# Patient Record
Sex: FEMALE | Race: WHITE | NOT HISPANIC OR LATINO | Employment: PART TIME | URBAN - METROPOLITAN AREA
[De-identification: names, ages, dates, MRNs, and addresses within clinical notes are randomized per-mention and may not be internally consistent; named-entity substitution may affect disease eponyms.]

---

## 2020-07-31 ENCOUNTER — LAB (OUTPATIENT)
Dept: LAB | Facility: HOSPITAL | Age: 20
End: 2020-07-31

## 2020-07-31 ENCOUNTER — OFFICE VISIT (OUTPATIENT)
Dept: FAMILY MEDICINE CLINIC | Facility: CLINIC | Age: 20
End: 2020-07-31

## 2020-07-31 VITALS
WEIGHT: 124 LBS | HEIGHT: 66 IN | BODY MASS INDEX: 19.93 KG/M2 | SYSTOLIC BLOOD PRESSURE: 102 MMHG | DIASTOLIC BLOOD PRESSURE: 68 MMHG

## 2020-07-31 DIAGNOSIS — Z00.00 PHYSICAL EXAM, ANNUAL: ICD-10-CM

## 2020-07-31 DIAGNOSIS — Z76.89 ENCOUNTER TO ESTABLISH CARE: Primary | ICD-10-CM

## 2020-07-31 DIAGNOSIS — Z13.0 SCREENING FOR DEFICIENCY ANEMIA: ICD-10-CM

## 2020-07-31 DIAGNOSIS — Z11.59 ENCOUNTER FOR HEPATITIS C SCREENING TEST FOR LOW RISK PATIENT: ICD-10-CM

## 2020-07-31 DIAGNOSIS — Z11.1 VISIT FOR TB SKIN TEST: ICD-10-CM

## 2020-07-31 DIAGNOSIS — Z13.1 SCREENING FOR DIABETES MELLITUS: ICD-10-CM

## 2020-07-31 DIAGNOSIS — Z13.29 SCREENING FOR THYROID DISORDER: ICD-10-CM

## 2020-07-31 DIAGNOSIS — E55.9 VITAMIN D DEFICIENCY: ICD-10-CM

## 2020-07-31 DIAGNOSIS — Z23 NEED FOR TDAP VACCINATION: ICD-10-CM

## 2020-07-31 PROBLEM — K21.9 ACID REFLUX: Status: ACTIVE | Noted: 2020-07-31

## 2020-07-31 PROBLEM — G43.909 MIGRAINE WITHOUT STATUS MIGRAINOSUS, NOT INTRACTABLE: Status: ACTIVE | Noted: 2020-07-31

## 2020-07-31 LAB
ALBUMIN SERPL-MCNC: 4.4 G/DL (ref 3.5–5.2)
ALBUMIN/GLOB SERPL: 1.7 G/DL
ALP SERPL-CCNC: 60 U/L (ref 39–117)
ALT SERPL W P-5'-P-CCNC: 11 U/L (ref 1–33)
ANION GAP SERPL CALCULATED.3IONS-SCNC: 10.6 MMOL/L (ref 5–15)
AST SERPL-CCNC: 17 U/L (ref 1–32)
BASOPHILS # BLD AUTO: 0.05 10*3/MM3 (ref 0–0.2)
BASOPHILS NFR BLD AUTO: 0.7 % (ref 0–1.5)
BILIRUB SERPL-MCNC: 0.9 MG/DL (ref 0–1.2)
BUN SERPL-MCNC: 7 MG/DL (ref 6–20)
BUN/CREAT SERPL: 8.6 (ref 7–25)
CALCIUM SPEC-SCNC: 9.6 MG/DL (ref 8.6–10.5)
CHLORIDE SERPL-SCNC: 102 MMOL/L (ref 98–107)
CO2 SERPL-SCNC: 26.4 MMOL/L (ref 22–29)
CREAT SERPL-MCNC: 0.81 MG/DL (ref 0.57–1)
DEPRECATED RDW RBC AUTO: 39.7 FL (ref 37–54)
EOSINOPHIL # BLD AUTO: 0.1 10*3/MM3 (ref 0–0.4)
EOSINOPHIL NFR BLD AUTO: 1.4 % (ref 0.3–6.2)
ERYTHROCYTE [DISTWIDTH] IN BLOOD BY AUTOMATED COUNT: 11.1 % (ref 12.3–15.4)
GFR SERPL CREATININE-BSD FRML MDRD: 88 ML/MIN/1.73
GLOBULIN UR ELPH-MCNC: 2.6 GM/DL
GLUCOSE SERPL-MCNC: 78 MG/DL (ref 65–99)
HCT VFR BLD AUTO: 40.2 % (ref 34–46.6)
HGB BLD-MCNC: 13.7 G/DL (ref 12–15.9)
IMM GRANULOCYTES # BLD AUTO: 0.01 10*3/MM3 (ref 0–0.05)
IMM GRANULOCYTES NFR BLD AUTO: 0.1 % (ref 0–0.5)
INDURATION: 0 MM (ref 0–10)
LYMPHOCYTES # BLD AUTO: 2.84 10*3/MM3 (ref 0.7–3.1)
LYMPHOCYTES NFR BLD AUTO: 41.1 % (ref 19.6–45.3)
MCH RBC QN AUTO: 32.8 PG (ref 26.6–33)
MCHC RBC AUTO-ENTMCNC: 34.1 G/DL (ref 31.5–35.7)
MCV RBC AUTO: 96.2 FL (ref 79–97)
MONOCYTES # BLD AUTO: 0.48 10*3/MM3 (ref 0.1–0.9)
MONOCYTES NFR BLD AUTO: 6.9 % (ref 5–12)
NEUTROPHILS NFR BLD AUTO: 3.43 10*3/MM3 (ref 1.7–7)
NEUTROPHILS NFR BLD AUTO: 49.8 % (ref 42.7–76)
NRBC BLD AUTO-RTO: 0 /100 WBC (ref 0–0.2)
PLATELET # BLD AUTO: 274 10*3/MM3 (ref 140–450)
PMV BLD AUTO: 11.3 FL (ref 6–12)
POTASSIUM SERPL-SCNC: 3.8 MMOL/L (ref 3.5–5.2)
PROT SERPL-MCNC: 7 G/DL (ref 6–8.5)
RBC # BLD AUTO: 4.18 10*6/MM3 (ref 3.77–5.28)
SODIUM SERPL-SCNC: 139 MMOL/L (ref 136–145)
TB SKIN TEST: NEGATIVE
WBC # BLD AUTO: 6.91 10*3/MM3 (ref 3.4–10.8)

## 2020-07-31 PROCEDURE — 90715 TDAP VACCINE 7 YRS/> IM: CPT | Performed by: PHYSICIAN ASSISTANT

## 2020-07-31 PROCEDURE — 86803 HEPATITIS C AB TEST: CPT | Performed by: PHYSICIAN ASSISTANT

## 2020-07-31 PROCEDURE — 82306 VITAMIN D 25 HYDROXY: CPT | Performed by: PHYSICIAN ASSISTANT

## 2020-07-31 PROCEDURE — 86580 TB INTRADERMAL TEST: CPT | Performed by: PHYSICIAN ASSISTANT

## 2020-07-31 PROCEDURE — 85025 COMPLETE CBC W/AUTO DIFF WBC: CPT | Performed by: PHYSICIAN ASSISTANT

## 2020-07-31 PROCEDURE — 36415 COLL VENOUS BLD VENIPUNCTURE: CPT | Performed by: PHYSICIAN ASSISTANT

## 2020-07-31 PROCEDURE — 80053 COMPREHEN METABOLIC PANEL: CPT | Performed by: PHYSICIAN ASSISTANT

## 2020-07-31 PROCEDURE — 90471 IMMUNIZATION ADMIN: CPT | Performed by: PHYSICIAN ASSISTANT

## 2020-07-31 PROCEDURE — 84443 ASSAY THYROID STIM HORMONE: CPT | Performed by: PHYSICIAN ASSISTANT

## 2020-07-31 PROCEDURE — 99385 PREV VISIT NEW AGE 18-39: CPT | Performed by: PHYSICIAN ASSISTANT

## 2020-07-31 RX ORDER — LEVONORGESTREL AND ETHINYL ESTRADIOL 0.1-0.02MG
1 KIT ORAL DAILY
COMMUNITY
End: 2020-10-15 | Stop reason: SDUPTHER

## 2020-07-31 NOTE — PROGRESS NOTES
Patient Care Team:  Rosalba Palacios PA-C as PCP - General (Physician Assistant)     Chief complaint: Patient is in today for a physical     Calliegh Tayler Coyle is a 20 y.o. female who presents for her yearly physical exam.     Patient is very healthy with no significant past medical history.  Takes levsin prn, maybe once a week for GI issues and is on birth control.  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  Originally from [facility].  Recently had tonsillectomy 2 weeks ago and is recovering well.  Goes to eye doctor regularly.  Due for dental screening.  Needs referral to gynecologist, had pap smear last year and it was normal.  She has no concerns for STIs.  Denies any skin lesions or moles, wears sunscreen regularly.  Needs Tdap and 2 step TB skin test today.  She has no complaints or concerns today.       Review of Systems   Constitutional: Negative for activity change, appetite change, chills, diaphoresis, fatigue, fever, unexpected weight gain and unexpected weight loss.   HENT: Positive for ear pain and sore throat. Negative for congestion, dental problem, hearing loss, nosebleeds, sinus pressure and trouble swallowing.    Eyes: Negative for blurred vision, pain, redness and visual disturbance.   Respiratory: Negative for apnea, cough, chest tightness, shortness of breath and wheezing.    Cardiovascular: Negative for chest pain, palpitations and leg swelling.   Gastrointestinal: Negative for abdominal distention, abdominal pain, anal bleeding, blood in stool, constipation, diarrhea, nausea, vomiting, GERD and indigestion.   Endocrine: Negative for cold intolerance, heat intolerance, polydipsia, polyphagia and polyuria.   Genitourinary: Negative for decreased urine volume, difficulty urinating, dysuria, frequency, hematuria, urgency and urinary incontinence.   Musculoskeletal: Negative for gait problem, joint swelling and bursitis.   Skin: Negative for dry skin, rash, skin  lesions and bruise.   Neurological: Positive for headache. Negative for dizziness, tremors, seizures, syncope, speech difficulty, weakness, light-headedness, memory problem and confusion.   Hematological: Does not bruise/bleed easily.   Psychiatric/Behavioral: Negative for agitation, behavioral problems, decreased concentration, hallucinations, sleep disturbance, suicidal ideas, depressed mood and stress. The patient is not nervous/anxious.         History  History reviewed. No pertinent past medical history.   Past Surgical History:   Procedure Laterality Date   • CHOLECYSTECTOMY     • TONSILLECTOMY        Allergies   Allergen Reactions   • Bactrim [Sulfamethoxazole-Trimethoprim] Swelling     THROAT    • Toradol [Ketorolac Tromethamine] Hives      Family History   Problem Relation Age of Onset   • Migraines Mother    • Depression Mother    • Irritable bowel syndrome Mother    • Arthritis Maternal Grandmother    • Hyperlipidemia Maternal Grandfather    • Diabetes Maternal Grandfather    • Arthritis Paternal Grandmother    • Colon cancer Paternal Grandmother    • Colon cancer Paternal Grandfather 70   • Heart disease Paternal Grandfather    • Hyperlipidemia Paternal Grandfather    • Breast cancer Neg Hx    • Ovarian cancer Neg Hx    • Prostate cancer Neg Hx       Social History     Socioeconomic History   • Marital status: Single     Spouse name: Not on file   • Number of children: Not on file   • Years of education: Not on file   • Highest education level: Not on file   Tobacco Use   • Smoking status: Never Smoker   • Smokeless tobacco: Never Used   Substance and Sexual Activity   • Alcohol use: Not Currently     Frequency: Never   • Drug use: Never   • Sexual activity: Yes     Partners: Male     Birth control/protection: Pill      Current Outpatient Medications on File Prior to Visit   Medication Sig Dispense Refill   • hyoscyamine (LEVSIN) 0.125 MG SL tablet Take 0.125 mg by mouth Every 4 (Four) Hours As Needed  "for Cramping.     • levonorgestrel-ethinyl estradiol (Lessina) 0.1-20 MG-MCG per tablet Take 1 tablet by mouth Daily.       No current facility-administered medications on file prior to visit.        No results found for this or any previous visit.    Health Maintenance   Topic Date Due   • TDAP/TD VACCINES (1 - Tdap) 2009   • HEPATITIS C SCREENING  2020   • CHLAMYDIA SCREENING  2020   • PAP SMEAR  2020   • INFLUENZA VACCINE  2020   • ANNUAL PHYSICAL  2021   • MENINGOCOCCAL VACCINE (Normal Risk)  Aged Out   • HPV VACCINES  Discontinued       Immunizations  Td/Tdap(Booster Q 10 yrs):  Prescribed  Flu (Yearly):  N/A  Pneumovax (1 yr after Prevnar):  N/A  Tlmycov94 (1 yr after Pneumo):  N/A  Hep B:  Completed  Shringrix:  N/A}   There is no immunization history for the selected administration types on file for this patient.      Diabetes:  No   Eye Exam: N/A   Foot Exam:  N/A  Obesity Counseling:  N/A  No results found for: HGBA1C, MICROALBUR    Patient's Body mass index is 20.17 kg/m². BMI is within normal parameters. No follow-up required..      Colorectal Screening:  N/A  Pap:  Complete  Mammogram:  N/A  PSA(Over age 50):  N/A  US Aorta (For male smokers, age 65):  N/A  CT for Smoker (Age 55-75, 30pk yr):  N/A  Bone Density/DEXA:  N/A  Hep C ( 6505-8905):  Ordered Today      No results found for this or any previous visit.         Vitals:    20 1432   BP: 102/68   BP Location: Left arm   Patient Position: Sitting   Cuff Size: Adult   Weight: 56.2 kg (124 lb)   Height: 167 cm (65.75\")       Body mass index is 20.17 kg/m².    Physical Exam   Constitutional: She is oriented to person, place, and time. Vital signs are normal. She appears well-developed and well-nourished. She is active and cooperative. She does not appear ill. No distress. She is not overweight.  HENT:   Head: Normocephalic and atraumatic.   Right Ear: Hearing, tympanic membrane, external ear and ear canal " normal.   Left Ear: Hearing, tympanic membrane, external ear and ear canal normal.   Nose: Nose normal. Right sinus exhibits no maxillary sinus tenderness and no frontal sinus tenderness. Left sinus exhibits no maxillary sinus tenderness and no frontal sinus tenderness.   Mouth/Throat: Uvula is midline, oropharynx is clear and moist and mucous membranes are normal.   Eyes: Conjunctivae, EOM and lids are normal.   Neck: Trachea normal, normal range of motion and phonation normal. No thyroid mass and no thyromegaly present.   Cardiovascular: Normal rate, regular rhythm and normal heart sounds.   Pulmonary/Chest: Effort normal and breath sounds normal.   Abdominal: Soft. Normal appearance and bowel sounds are normal. She exhibits no distension. There is no tenderness. There is no rigidity, no guarding and no CVA tenderness.   Musculoskeletal: Normal range of motion. She exhibits no edema.   Lymphadenopathy:     She has no cervical adenopathy.        Right cervical: No superficial cervical adenopathy present.       Left cervical: No superficial cervical adenopathy present.   Neurological: She is alert and oriented to person, place, and time. She has normal strength and normal reflexes. Coordination and gait normal.   CN grossly intact   Skin: Skin is warm and intact. No rash noted. She is not diaphoretic. No cyanosis or erythema. Nails show no clubbing.   Psychiatric: She has a normal mood and affect. Her speech is normal and behavior is normal. Judgment and thought content normal. She is not actively hallucinating. Cognition and memory are normal. She is attentive.   Vitals reviewed.          Counseling provided on diet and nutrition, exercise, supplements and flu prevention.    David was seen today for establish care.    Diagnoses and all orders for this visit:    Encounter to establish care  -     Ambulatory Referral to Gynecology    Physical exam, annual  -     CBC Auto Differential; Future  -     TSH Rfx On  Abnormal To Free T4; Future  -     Comprehensive Metabolic Panel; Future  PE is unremarkable  Preventative labs ordered  Gynecology referral placed  Dentist - she needs to make appointment  Ophthalmologist - goes regularly  Dermatologist - denies any skin lesions or moles  Flu vaccine encouraged in Fall, Tdap today    Encounter for hepatitis C screening test for low risk patient  -     Hepatitis C Antibody; Future    Vitamin D deficiency  -     Vitamin D 25 Hydroxy; Future    Screening for deficiency anemia  -     CBC Auto Differential; Future    Screening for diabetes mellitus  -     Comprehensive Metabolic Panel; Future    Screening for thyroid disorder  -     TSH Rfx On Abnormal To Free T4; Future    Visit for TB skin test  -     TB Skin Test    Need for Tdap vaccination  -     Tdap Vaccine Greater Than or Equal To 8yo IM       Rosalba Palacios PA-C   7/31/2020   15:17

## 2020-08-01 LAB
25(OH)D3 SERPL-MCNC: 41.9 NG/ML (ref 30–100)
HCV AB SER DONR QL: NORMAL
TSH SERPL DL<=0.05 MIU/L-ACNC: 0.79 UIU/ML (ref 0.27–4.2)

## 2020-08-10 ENCOUNTER — CLINICAL SUPPORT (OUTPATIENT)
Dept: FAMILY MEDICINE CLINIC | Facility: CLINIC | Age: 20
End: 2020-08-10

## 2020-08-10 DIAGNOSIS — Z11.1 VISIT FOR TB SKIN TEST: Primary | ICD-10-CM

## 2020-08-10 LAB
INDURATION: 0 MM (ref 0–10)
Lab: NORMAL
Lab: NORMAL
TB SKIN TEST: NEGATIVE

## 2020-08-10 PROCEDURE — 86580 TB INTRADERMAL TEST: CPT | Performed by: PHYSICIAN ASSISTANT

## 2020-10-09 PROBLEM — Z01.419 WELL WOMAN EXAM: Status: ACTIVE | Noted: 2020-10-09

## 2020-10-15 ENCOUNTER — OFFICE VISIT (OUTPATIENT)
Dept: OBSTETRICS AND GYNECOLOGY | Facility: CLINIC | Age: 20
End: 2020-10-15

## 2020-10-15 VITALS
BODY MASS INDEX: 20 KG/M2 | RESPIRATION RATE: 14 BRPM | DIASTOLIC BLOOD PRESSURE: 64 MMHG | SYSTOLIC BLOOD PRESSURE: 106 MMHG | WEIGHT: 123 LBS

## 2020-10-15 DIAGNOSIS — Z01.419 WELL WOMAN EXAM: Primary | ICD-10-CM

## 2020-10-15 DIAGNOSIS — Z72.51 HIGH RISK HETEROSEXUAL BEHAVIOR: ICD-10-CM

## 2020-10-15 PROBLEM — K21.9 ACID REFLUX: Status: RESOLVED | Noted: 2020-07-31 | Resolved: 2020-10-15

## 2020-10-15 PROCEDURE — 99385 PREV VISIT NEW AGE 18-39: CPT | Performed by: OBSTETRICS & GYNECOLOGY

## 2020-10-15 RX ORDER — LEVONORGESTREL AND ETHINYL ESTRADIOL 0.1-0.02MG
1 KIT ORAL DAILY
Qty: 84 TABLET | Refills: 5 | Status: SHIPPED | OUTPATIENT
Start: 2020-10-15 | End: 2021-06-16 | Stop reason: SINTOL

## 2020-10-15 RX ORDER — MULTIVITAMIN
TABLET ORAL DAILY
COMMUNITY

## 2020-10-15 NOTE — PROGRESS NOTES
Subjective   Chief Complaint   Patient presents with   • Gynecologic Exam     David Coyle is a 20 y.o. year old  presenting to be seen for her annual exam.     SEXUAL Hx:  She is currently sexually active.  In the past year there there has been NO new sexual partners.    Condoms are never used.  She would not like to be screened for STD's at today's exam.  Current birth control method: OCP (estrogen/progesterone).  She is happy with her current method of contraception and does not want to discuss alternative methods of contraception.  MENSTRUAL Hx:  Patient's last menstrual period was 2020 (approximate).  In the past 6 months her cycles have been regular, predictable and occur monthly.  Her menstrual flow is typically normal.   Each month on average there are roughly 0 day(s) of very heavy flow.  Intermenstrual bleeding is present - once.    Post-coital bleeding is absent.  Dysmenorrhea: is not affecting her activities of daily living  PMS: is not affecting her activities of daily living  Her cycles are not a source of concern for her that she wishes to discuss today.  HEALTH Hx:  She exercises regularly: no (but is planning to start exercising more ).  She wears her seat belt: yes.  She has concerns about domestic violence: no.  OTHER THINGS SHE WANTS TO DISCUSS TODAY:  Nothing else    The following portions of the patient's history were reviewed and updated as appropriate:problem list, current medications, allergies, past family history, past medical history, past social history and past surgical history.    Social History    Tobacco Use      Smoking status: Never Smoker      Smokeless tobacco: Never Used    Review of Systems  Constitutional POS: nothing reported    NEG: anorexia or night sweats   Genitourinary POS: nothing reported    NEG: dysuria or hematuria      Gastointestinal POS: nothing reported    NEG: bloating, change in bowel habits, melena or reflux symptoms   Integument POS:  nothing reported    NEG: moles that are changing in size, shape, color or rashes   Breast POS: nothing reported    NEG: persistent breast lump, skin dimpling or nipple discharge        Objective   /64   Resp 14   Wt 55.8 kg (123 lb)   LMP 09/20/2020 (Approximate)   Breastfeeding No   BMI 20.00 kg/m²     General:  well developed; well nourished  no acute distress   Pelvis: Clinical staff was present for exam  External genitalia:  normal appearance of the external genitalia including Bartholin's and [place]'s glands.  :  urethral meatus normal;  Vaginal:  normal pink mucosa without prolapse or lesions.  Cervix:  normal appearance.        Assessment   1. Normal GYN exam  2. STD screening  3. Irregular menses 1 cycle only     Plan   1. Pap and STD testing was done today.  If she does not receive the results of the Pap within 2 weeks  time, she was instructed to call to find out the results.  I explained to David that the recommendations for Pap smear interval in a low risk patient's has lengthened to 3 years time.  I encouraged her to be seen yearly for a full physical exam including breast and pelvic exam even during the off years when PAP's will not be performed.  2. If irregular menses continues to be a problem let me know and we will talk about ways to add estrogen to her current pill pack that should eliminate the breakthrough bleeding  3. I discussed with David that she may be behind on needed vaccinations for vascular to get me information about her HPV vaccinations we can update her vaccine schedule.  She may be able to obtain these vaccinations at her local pharmacy OR speak about obtaining them with her primary care.  If she does obtain her vaccines, I have asked David to let us know the date each vaccine was obtained so that her medical record could be updated in our system.  4. The use of condoms for STD prevention was emphasized.  It was explained to David that condoms are not a full  proof way to eliminate the chance of a sexually transmitted disease.  Ultimately knowing her partner's sexual history is most important.  All of her questions related to condom use were answered.  5. The importance of keeping all planned follow-up and taking all medications as prescribed was emphasized.  6. Follow up PRN     New Medications Ordered This Visit   Medications   • levonorgestrel-ethinyl estradiol (Lessina) 0.1-20 MG-MCG per tablet     Sig: Take 1 tablet by mouth Daily.     Dispense:  84 tablet     Refill:  5          This note was electronically signed.    Noman Aguilar M.D.  October 15, 2020    Note: Speech recognition transcription software may have been used to create portions of this document.  An attempt at proofreading has been made but errors in transcription could still be present.

## 2021-01-23 PROCEDURE — 87086 URINE CULTURE/COLONY COUNT: CPT | Performed by: FAMILY MEDICINE

## 2021-02-22 ENCOUNTER — LAB (OUTPATIENT)
Dept: LAB | Facility: HOSPITAL | Age: 21
End: 2021-02-22

## 2021-02-22 ENCOUNTER — OFFICE VISIT (OUTPATIENT)
Dept: FAMILY MEDICINE CLINIC | Facility: CLINIC | Age: 21
End: 2021-02-22

## 2021-02-22 VITALS
WEIGHT: 121 LBS | SYSTOLIC BLOOD PRESSURE: 102 MMHG | BODY MASS INDEX: 20.16 KG/M2 | TEMPERATURE: 97.8 F | HEIGHT: 65 IN | DIASTOLIC BLOOD PRESSURE: 80 MMHG | HEART RATE: 84 BPM | OXYGEN SATURATION: 98 %

## 2021-02-22 DIAGNOSIS — L03.031 PARONYCHIA OF TOE OF BOTH FEET: Primary | ICD-10-CM

## 2021-02-22 DIAGNOSIS — L03.032 PARONYCHIA OF TOE OF BOTH FEET: Primary | ICD-10-CM

## 2021-02-22 DIAGNOSIS — Z13.1 SCREENING FOR DIABETES MELLITUS: ICD-10-CM

## 2021-02-22 DIAGNOSIS — Z13.29 SCREENING FOR THYROID DISORDER: ICD-10-CM

## 2021-02-22 DIAGNOSIS — L81.9 DISCOLORATION OF SKIN OF TOE: ICD-10-CM

## 2021-02-22 DIAGNOSIS — R10.12 LUQ PAIN: ICD-10-CM

## 2021-02-22 DIAGNOSIS — R39.15 URGENCY OF URINATION: ICD-10-CM

## 2021-02-22 LAB
ALBUMIN SERPL-MCNC: 4.4 G/DL (ref 3.5–5.2)
ALBUMIN/GLOB SERPL: 1.6 G/DL
ALP SERPL-CCNC: 74 U/L (ref 39–117)
ALT SERPL W P-5'-P-CCNC: 11 U/L (ref 1–33)
ANION GAP SERPL CALCULATED.3IONS-SCNC: 8.8 MMOL/L (ref 5–15)
APTT PPP: 31.2 SECONDS (ref 24–37)
AST SERPL-CCNC: 19 U/L (ref 1–32)
BILIRUB BLD-MCNC: NEGATIVE MG/DL
BILIRUB SERPL-MCNC: 1.6 MG/DL (ref 0–1.2)
BUN SERPL-MCNC: 8 MG/DL (ref 6–20)
BUN/CREAT SERPL: 11.6 (ref 7–25)
CALCIUM SPEC-SCNC: 9.7 MG/DL (ref 8.6–10.5)
CHLORIDE SERPL-SCNC: 103 MMOL/L (ref 98–107)
CLARITY, POC: CLEAR
CO2 SERPL-SCNC: 26.2 MMOL/L (ref 22–29)
COLOR UR: YELLOW
CREAT SERPL-MCNC: 0.69 MG/DL (ref 0.57–1)
GFR SERPL CREATININE-BSD FRML MDRD: 106 ML/MIN/1.73
GLOBULIN UR ELPH-MCNC: 2.8 GM/DL
GLUCOSE SERPL-MCNC: 74 MG/DL (ref 65–99)
GLUCOSE UR STRIP-MCNC: NEGATIVE MG/DL
INR PPP: 1.02 (ref 0.85–1.16)
KETONES UR QL: NEGATIVE
LEUKOCYTE EST, POC: NEGATIVE
NITRITE UR-MCNC: NEGATIVE MG/ML
PH UR: 6.5 [PH] (ref 5–8)
POTASSIUM SERPL-SCNC: 4.2 MMOL/L (ref 3.5–5.2)
PROT SERPL-MCNC: 7.2 G/DL (ref 6–8.5)
PROT UR STRIP-MCNC: NEGATIVE MG/DL
PROTHROMBIN TIME: 13.1 SECONDS (ref 11.5–14)
RBC # UR STRIP: ABNORMAL /UL
SODIUM SERPL-SCNC: 138 MMOL/L (ref 136–145)
SP GR UR: 1.01 (ref 1–1.03)
TSH SERPL DL<=0.05 MIU/L-ACNC: 1.11 UIU/ML (ref 0.27–4.2)
UROBILINOGEN UR QL: NORMAL

## 2021-02-22 PROCEDURE — 85730 THROMBOPLASTIN TIME PARTIAL: CPT

## 2021-02-22 PROCEDURE — 99214 OFFICE O/P EST MOD 30 MIN: CPT | Performed by: PHYSICIAN ASSISTANT

## 2021-02-22 PROCEDURE — 81003 URINALYSIS AUTO W/O SCOPE: CPT | Performed by: PHYSICIAN ASSISTANT

## 2021-02-22 PROCEDURE — 85025 COMPLETE CBC W/AUTO DIFF WBC: CPT

## 2021-02-22 PROCEDURE — 85610 PROTHROMBIN TIME: CPT

## 2021-02-22 PROCEDURE — 86038 ANTINUCLEAR ANTIBODIES: CPT

## 2021-02-22 PROCEDURE — 84443 ASSAY THYROID STIM HORMONE: CPT

## 2021-02-22 PROCEDURE — 80053 COMPREHEN METABOLIC PANEL: CPT

## 2021-02-22 RX ORDER — NITROFURANTOIN 25; 75 MG/1; MG/1
100 CAPSULE ORAL 2 TIMES DAILY
COMMUNITY
Start: 2021-01-05 | End: 2021-02-22

## 2021-02-22 NOTE — PROGRESS NOTES
Chief Complaint   Patient presents with   • Foot Swelling     Pt states her toes are swollen, hard, and they hurt. Pt states this has been going on for about a week. Pt states she went to UC and was told she may have an autoimmune disease.    • Urinary Urgency     Pt states she has been having some urine urgency for about 2 months.Pt states she went to UC and had a UA and culture done and was told she had a UTI. Pt states her culture results came back okay and she is done with the antibiotics for the UTI but still have the same symtoms. Pt states she went to UC again yesterday and they did not want to repeat the UA because they think something else is going on.        DANNIE Coyle is a 20 y.o. female who presents for Foot Swelling (Pt states her toes are swollen, hard, and they hurt. Pt states this has been going on for about a week. Pt states she went to UC and was told she may have an autoimmune disease. ) and Urinary Urgency (Pt states she has been having some urine urgency for about 2 months.Pt states she went to UC and had a UA and culture done and was told she had a UTI. Pt states her culture results came back okay and she is done with the antibiotics for the UTI but still have the same symtoms. Pt states she went to UC again yesterday and they did not want to repeat the UA because they think something else is going on. )    Patient reports bilateral foot pain and discoloration that she noticed several weeks ago that worsened in the last week.  She had a pedicure a month ago, did not notice symptoms then.  No known trauma/injury.  No purulent drainage.  Has had discoloration for years, attributed it to poor circulation.  Numbness in feet while in shower with hot water.  Mother diagnosed with lupus.  Patient has not had Covid.  Has received 2 doses of Covid Vaccine, symptoms started prior to that.    She also reports episodic LUQ pain that is sharp and achy when it occurs.  She is unable to  attribute any alleviating or aggravating factors for pain.  Started around same time as issues with toes.    Denies any urinary burning, frequency today.  No suprapubic or flank pain.  Mild urgency.  Completed antibiotic.      Past Medical History:   Diagnosis Date   • Irritable bowel - diarrhea predominant 2015       Past Surgical History:   Procedure Laterality Date   • LAPAROSCOPIC CHOLECYSTECTOMY  02/2016   • TONSILLECTOMY  07/2020       Family History   Problem Relation Age of Onset   • Migraines Mother    • Depression Mother    • Irritable bowel syndrome Mother    • Arthritis Maternal Grandmother    • Hyperlipidemia Maternal Grandfather    • Diabetes Maternal Grandfather    • Arthritis Paternal Grandmother    • Colon cancer Paternal Grandmother    • Colon cancer Paternal Grandfather 70   • Heart disease Paternal Grandfather    • Hyperlipidemia Paternal Grandfather    • Breast cancer Neg Hx    • Ovarian cancer Neg Hx    • Prostate cancer Neg Hx        Social History     Socioeconomic History   • Marital status: Single     Spouse name: Not on file   • Number of children: Not on file   • Years of education: Not on file   • Highest education level: Not on file   Tobacco Use   • Smoking status: Never Smoker   • Smokeless tobacco: Never Used   Substance and Sexual Activity   • Alcohol use: Not Currently     Frequency: Never   • Drug use: Never   • Sexual activity: Yes     Partners: Male     Birth control/protection: Pill       Allergies   Allergen Reactions   • Bactrim [Sulfamethoxazole-Trimethoprim] Swelling     THROAT    • Toradol [Ketorolac Tromethamine] Hives       ROS    Review of Systems   Constitutional: Negative for chills and fever.   Gastrointestinal: Positive for abdominal pain.   Genitourinary: Positive for urgency. Negative for dysuria, flank pain, frequency, hematuria and pelvic pain.   Skin: Positive for color change and skin lesions.   Neurological: Positive for numbness.       Vitals:    02/22/21  "1127   BP: 102/80   Pulse: 84   Temp: 97.8 °F (36.6 °C)   SpO2: 98%   Weight: 54.9 kg (121 lb)   Height: 165.1 cm (65\")   PainSc:   2     Body mass index is 20.14 kg/m².    Current Outpatient Medications on File Prior to Visit   Medication Sig Dispense Refill   • hyoscyamine (LEVSIN) 0.125 MG SL tablet Take 0.125 mg by mouth Every 4 (Four) Hours As Needed for Cramping.     • levonorgestrel-ethinyl estradiol (Lessina) 0.1-20 MG-MCG per tablet Take 1 tablet by mouth Daily. 84 tablet 5   • multivitamin (DAILY DOMENICO) tablet tablet Take  by mouth Daily.     • [DISCONTINUED] nitrofurantoin, macrocrystal-monohydrate, (MACROBID) 100 MG capsule Take 100 mg by mouth 2 (Two) Times a Day.       No current facility-administered medications on file prior to visit.        Results for orders placed or performed in visit on 02/22/21   POCT urinalysis dipstick, automated    Specimen: Urine   Result Value Ref Range    Color Yellow Yellow, Straw, Dark Yellow, Nikki    Clarity, UA Clear Clear    Specific Gravity  1.015 1.005 - 1.030    pH, Urine 6.5 5.0 - 8.0    Leukocytes Negative Negative    Nitrite, UA Negative Negative    Protein, POC Negative Negative mg/dL    Glucose, UA Negative Negative, 1000 mg/dL (3+) mg/dL    Ketones, UA Negative Negative    Urobilinogen, UA Normal Normal    Bilirubin Negative Negative    Blood, UA Trace (A) Negative       PE    Physical Exam  Vitals signs reviewed.   Constitutional:       General: She is not in acute distress.     Appearance: Normal appearance. She is well-developed and normal weight. She is not ill-appearing or diaphoretic.   HENT:      Head: Normocephalic and atraumatic.   Eyes:      Extraocular Movements: Extraocular movements intact.      Conjunctiva/sclera: Conjunctivae normal.   Neck:      Musculoskeletal: Normal range of motion.   Cardiovascular:      Rate and Rhythm: Normal rate and regular rhythm.      Heart sounds: Normal heart sounds.   Pulmonary:      Effort: Pulmonary effort is " normal.      Breath sounds: Normal breath sounds.   Abdominal:      Palpations: Abdomen is soft. There is no hepatomegaly, splenomegaly or mass.      Tenderness: There is abdominal tenderness in the left upper quadrant. There is no guarding.   Musculoskeletal: Normal range of motion.      Right lower leg: No edema.      Left lower leg: No edema.        Feet:    Feet:      Comments: Feet are discolored, purplish.  Has a few areas of possible purpura.  Skin:     General: Skin is warm.      Coloration: Skin is pale.      Findings: No erythema or rash.   Neurological:      General: No focal deficit present.      Mental Status: She is alert.   Psychiatric:         Attention and Perception: She is attentive.         Mood and Affect: Mood normal.         Speech: Speech normal.         Behavior: Behavior normal. Behavior is cooperative.         Thought Content: Thought content normal.         Judgment: Judgment normal.          A/P    Diagnoses and all orders for this visit:    1. Paronychia of toe of both feet (Primary)  -     mupirocin (Bactroban) 2 % ointment; Apply  topically to the appropriate area as directed 3 (Three) Times a Day.  Dispense: 30 g; Refill: 0  Trial treatment of mupirocin on paronychia of toes.  Sitz bath.  Avoid pedicure.    2. Discoloration of skin of toe  -     CBC Auto Differential; Future  -     Protime-INR; Future  -     aPTT; Future  -     SANDY With / DsDNA, RNP, Sjogrens A / B, Demarco; Future  Return in 1 week to evaluate toes again and discuss labs.    3. LUQ pain  Episodic, no alleviating/aggravating factors for the last 4 weeks.  No splenomegaly appreciated on exam.    Discussed possibility of musculoskeletal or GI associated issues with pain.  Will check labs including CBC especially given discoloration of toes.    4. Urgency of urination  -     POCT urinalysis dipstick, automated  Urinalysis is normal.    5. Screening for thyroid disorder  -     TSH Rfx On Abnormal To Free T4; Future    6.  Screening for diabetes mellitus  -     Comprehensive Metabolic Panel; Future         Plan of care reviewed with patient at the conclusion of today's visit. Education was provided regarding diagnosis, management and any prescribed or recommended OTC medications.  Patient verbalizes understanding of and agreement with management plan.    Return in about 1 week (around 3/1/2021) for Recheck, discolored toes.     Rosalba Palacios PA-C

## 2021-02-23 LAB
BASOPHILS # BLD AUTO: 0.04 10*3/MM3 (ref 0–0.2)
BASOPHILS NFR BLD AUTO: 1 % (ref 0–1.5)
DEPRECATED RDW RBC AUTO: 39.3 FL (ref 37–54)
EOSINOPHIL # BLD AUTO: 0.06 10*3/MM3 (ref 0–0.4)
EOSINOPHIL NFR BLD AUTO: 1.5 % (ref 0.3–6.2)
ERYTHROCYTE [DISTWIDTH] IN BLOOD BY AUTOMATED COUNT: 11.3 % (ref 12.3–15.4)
HCT VFR BLD AUTO: 43.2 % (ref 34–46.6)
HGB BLD-MCNC: 14.8 G/DL (ref 12–15.9)
IMM GRANULOCYTES # BLD AUTO: 0.01 10*3/MM3 (ref 0–0.05)
IMM GRANULOCYTES NFR BLD AUTO: 0.2 % (ref 0–0.5)
LYMPHOCYTES # BLD AUTO: 1.55 10*3/MM3 (ref 0.7–3.1)
LYMPHOCYTES NFR BLD AUTO: 37.6 % (ref 19.6–45.3)
MCH RBC QN AUTO: 32.5 PG (ref 26.6–33)
MCHC RBC AUTO-ENTMCNC: 34.3 G/DL (ref 31.5–35.7)
MCV RBC AUTO: 94.9 FL (ref 79–97)
MONOCYTES # BLD AUTO: 0.37 10*3/MM3 (ref 0.1–0.9)
MONOCYTES NFR BLD AUTO: 9 % (ref 5–12)
NEUTROPHILS NFR BLD AUTO: 2.09 10*3/MM3 (ref 1.7–7)
NEUTROPHILS NFR BLD AUTO: 50.7 % (ref 42.7–76)
NRBC BLD AUTO-RTO: 0 /100 WBC (ref 0–0.2)
PLATELET # BLD AUTO: 222 10*3/MM3 (ref 140–450)
PMV BLD AUTO: 10.9 FL (ref 6–12)
RBC # BLD AUTO: 4.55 10*6/MM3 (ref 3.77–5.28)
WBC # BLD AUTO: 4.12 10*3/MM3 (ref 3.4–10.8)

## 2021-02-24 ENCOUNTER — TELEPHONE (OUTPATIENT)
Dept: FAMILY MEDICINE CLINIC | Facility: CLINIC | Age: 21
End: 2021-02-24

## 2021-02-24 DIAGNOSIS — L03.032 PARONYCHIA OF TOE OF BOTH FEET: Primary | ICD-10-CM

## 2021-02-24 DIAGNOSIS — L03.031 PARONYCHIA OF TOE OF BOTH FEET: Primary | ICD-10-CM

## 2021-02-24 LAB — ANA SER QL: NEGATIVE

## 2021-03-02 ENCOUNTER — OFFICE VISIT (OUTPATIENT)
Dept: FAMILY MEDICINE CLINIC | Facility: CLINIC | Age: 21
End: 2021-03-02

## 2021-03-02 VITALS
BODY MASS INDEX: 19.49 KG/M2 | DIASTOLIC BLOOD PRESSURE: 70 MMHG | OXYGEN SATURATION: 98 % | SYSTOLIC BLOOD PRESSURE: 104 MMHG | HEIGHT: 65 IN | WEIGHT: 117 LBS | HEART RATE: 105 BPM

## 2021-03-02 DIAGNOSIS — L03.039 PARONYCHIA OF TOE, UNSPECIFIED LATERALITY: ICD-10-CM

## 2021-03-02 DIAGNOSIS — L81.9 DISCOLORATION OF SKIN OF TOE: Primary | ICD-10-CM

## 2021-03-02 DIAGNOSIS — R20.9 BILATERAL COLD FEET: ICD-10-CM

## 2021-03-02 PROCEDURE — 99213 OFFICE O/P EST LOW 20 MIN: CPT | Performed by: PHYSICIAN ASSISTANT

## 2021-03-02 NOTE — PROGRESS NOTES
Chief Complaint   Patient presents with   • paronychia     follow up on discolored toes. pt states that her toes look better of a morning and then in the evening they look worse again       HPI     David Coyle is a pleasant 20 y.o. female who is here for routine follow-up toe discoloration/purple toes, bilateral cold feet and paronychia of multiple bilateral toes.  Patient reports that the paronychia has improved with topical mupirocin cream.  She continues to have discolored toes worse recently than in the past.  She states that her toes are always cold no matter how many pairs of socks she wears or how hard it is outside.  She has no discoloration on her hands and is able to warm these.  She has no pain with her feet, calves or legs.  No claudication.  Her recent labs were all unremarkable.  She denies any history of Covid 19 infection.  She has not been to a podiatrist or foot specialist.  She has never had an ultrasound of her arterial blood flow.    Past Medical History:   Diagnosis Date   • Irritable bowel - diarrhea predominant 2015       Past Surgical History:   Procedure Laterality Date   • LAPAROSCOPIC CHOLECYSTECTOMY  02/2016   • TONSILLECTOMY  07/2020       Family History   Problem Relation Age of Onset   • Migraines Mother    • Depression Mother    • Irritable bowel syndrome Mother    • Arthritis Maternal Grandmother    • Hyperlipidemia Maternal Grandfather    • Diabetes Maternal Grandfather    • Arthritis Paternal Grandmother    • Colon cancer Paternal Grandmother    • Colon cancer Paternal Grandfather 70   • Heart disease Paternal Grandfather    • Hyperlipidemia Paternal Grandfather    • Breast cancer Neg Hx    • Ovarian cancer Neg Hx    • Prostate cancer Neg Hx        Social History     Socioeconomic History   • Marital status: Single     Spouse name: Not on file   • Number of children: Not on file   • Years of education: Not on file   • Highest education level: Not on file   Tobacco Use  "  • Smoking status: Never Smoker   • Smokeless tobacco: Never Used   Substance and Sexual Activity   • Alcohol use: Not Currently     Frequency: Never   • Drug use: Never   • Sexual activity: Yes     Partners: Male     Birth control/protection: Pill       Allergies   Allergen Reactions   • Bactrim [Sulfamethoxazole-Trimethoprim] Swelling     THROAT    • Toradol [Ketorolac Tromethamine] Hives       ROS  Review of Systems   Constitutional: Negative for chills and fever.   Endocrine: Positive for cold intolerance.   Skin: Positive for color change.       Vitals:    03/02/21 0939   BP: 104/70   Pulse: 105   SpO2: 98%   Weight: 53.1 kg (117 lb)   Height: 165.1 cm (65\")     Body mass index is 19.47 kg/m².    Current Outpatient Medications on File Prior to Visit   Medication Sig Dispense Refill   • hyoscyamine (LEVSIN) 0.125 MG SL tablet Take 0.125 mg by mouth Every 4 (Four) Hours As Needed for Cramping.     • levonorgestrel-ethinyl estradiol (Lessina) 0.1-20 MG-MCG per tablet Take 1 tablet by mouth Daily. 84 tablet 5   • multivitamin (DAILY DOMENICO) tablet tablet Take  by mouth Daily.     • mupirocin (Bactroban) 2 % ointment Apply  topically to the appropriate area as directed 3 (Three) Times a Day. 30 g 0     No current facility-administered medications on file prior to visit.        Results for orders placed or performed in visit on 02/22/21   CBC Auto Differential    Specimen: Blood   Result Value Ref Range    WBC 4.12 3.40 - 10.80 10*3/mm3    RBC 4.55 3.77 - 5.28 10*6/mm3    Hemoglobin 14.8 12.0 - 15.9 g/dL    Hematocrit 43.2 34.0 - 46.6 %    MCV 94.9 79.0 - 97.0 fL    MCH 32.5 26.6 - 33.0 pg    MCHC 34.3 31.5 - 35.7 g/dL    RDW 11.3 (L) 12.3 - 15.4 %    RDW-SD 39.3 37.0 - 54.0 fl    MPV 10.9 6.0 - 12.0 fL    Platelets 222 140 - 450 10*3/mm3    Neutrophil % 50.7 42.7 - 76.0 %    Lymphocyte % 37.6 19.6 - 45.3 %    Monocyte % 9.0 5.0 - 12.0 %    Eosinophil % 1.5 0.3 - 6.2 %    Basophil % 1.0 0.0 - 1.5 %    Immature Grans % " 0.2 0.0 - 0.5 %    Neutrophils, Absolute 2.09 1.70 - 7.00 10*3/mm3    Lymphocytes, Absolute 1.55 0.70 - 3.10 10*3/mm3    Monocytes, Absolute 0.37 0.10 - 0.90 10*3/mm3    Eosinophils, Absolute 0.06 0.00 - 0.40 10*3/mm3    Basophils, Absolute 0.04 0.00 - 0.20 10*3/mm3    Immature Grans, Absolute 0.01 0.00 - 0.05 10*3/mm3    nRBC 0.0 0.0 - 0.2 /100 WBC   Protime-INR    Specimen: Blood   Result Value Ref Range    Protime 13.1 11.5 - 14.0 Seconds    INR 1.02 0.85 - 1.16   aPTT    Specimen: Blood   Result Value Ref Range    PTT 31.2 24.0 - 37.0 seconds   SANDY With / DsDNA, RNP, Sjogrens A / B, Demarco    Specimen: Blood   Result Value Ref Range    SANDY Direct Negative Negative   Comprehensive Metabolic Panel    Specimen: Blood   Result Value Ref Range    Glucose 74 65 - 99 mg/dL    BUN 8 6 - 20 mg/dL    Creatinine 0.69 0.57 - 1.00 mg/dL    Sodium 138 136 - 145 mmol/L    Potassium 4.2 3.5 - 5.2 mmol/L    Chloride 103 98 - 107 mmol/L    CO2 26.2 22.0 - 29.0 mmol/L    Calcium 9.7 8.6 - 10.5 mg/dL    Total Protein 7.2 6.0 - 8.5 g/dL    Albumin 4.40 3.50 - 5.20 g/dL    ALT (SGPT) 11 1 - 33 U/L    AST (SGOT) 19 1 - 32 U/L    Alkaline Phosphatase 74 39 - 117 U/L    Total Bilirubin 1.6 (H) 0.0 - 1.2 mg/dL    eGFR Non African Amer 106 >60 mL/min/1.73    Globulin 2.8 gm/dL    A/G Ratio 1.6 g/dL    BUN/Creatinine Ratio 11.6 7.0 - 25.0    Anion Gap 8.8 5.0 - 15.0 mmol/L   TSH Rfx On Abnormal To Free T4    Specimen: Blood   Result Value Ref Range    TSH 1.110 0.270 - 4.200 uIU/mL       PE    Physical Exam  Constitutional:       Appearance: She is normal weight.   Musculoskeletal:        Feet:    Feet:      Comments: Unable to detect dorsal pulses bilaterally.  Discoloration is dark pink/purple throughout bilateral feet and they are very cold.  No ulcerations or skin breakdown.  Neurological:      Mental Status: She is alert.         A/P    Diagnoses and all orders for this visit:    1. Discoloration of skin of toe (Primary)  -     Duplex  Lower Extremity Art / Grafts - Bilateral CAR; Future  -     US Ankle / Brachial Indices Extremity Complete; Future  Possible raynauds, although no symptoms in hands.  Could consider trying low dose CCB if ultrasounds are normal.  Also consider referral to podiatry.    2. Bilateral cold feet  -     US Ankle / Brachial Indices Extremity Complete; Future    3. Paronychia of toe, unspecified laterality  Improved with mupirocin.       Plan of care reviewed with patient at the conclusion of today's visit. Education was provided regarding diagnosis, management and any prescribed or recommended OTC medications.  Patient verbalizes understanding of and agreement with management plan.    No follow-ups on file.     Rosalba Palacios PA-C

## 2021-03-04 DIAGNOSIS — R20.9 BILATERAL COLD FEET: ICD-10-CM

## 2021-03-04 DIAGNOSIS — L81.9 DISCOLORATION OF SKIN OF TOE: Primary | ICD-10-CM

## 2021-04-22 ENCOUNTER — HOSPITAL ENCOUNTER (OUTPATIENT)
Dept: CARDIOLOGY | Facility: HOSPITAL | Age: 21
Discharge: HOME | End: 2021-04-22
Admitting: PHYSICIAN ASSISTANT

## 2021-04-22 VITALS — BODY MASS INDEX: 19.49 KG/M2 | HEIGHT: 65 IN | WEIGHT: 117 LBS

## 2021-04-22 DIAGNOSIS — R20.9 BILATERAL COLD FEET: ICD-10-CM

## 2021-04-22 DIAGNOSIS — L81.9 DISCOLORATION OF SKIN OF TOE: ICD-10-CM

## 2021-04-22 PROCEDURE — 93922 UPR/L XTREMITY ART 2 LEVELS: CPT

## 2021-04-22 PROCEDURE — 93922 UPR/L XTREMITY ART 2 LEVELS: CPT | Performed by: INTERNAL MEDICINE

## 2021-04-28 LAB
BH CV LOWER ARTERIAL LEFT ABI RATIO: 1.15
BH CV LOWER ARTERIAL LEFT DORSALIS PEDIS SYS MAX: 115 MMHG
BH CV LOWER ARTERIAL LEFT GREAT TOE SYS MAX: 75 MMHG
BH CV LOWER ARTERIAL LEFT POST TIBIAL SYS MAX: 131 MMHG
BH CV LOWER ARTERIAL LEFT TBI RATIO: 0.65
BH CV LOWER ARTERIAL RIGHT ABI RATIO: 1.11
BH CV LOWER ARTERIAL RIGHT DORSALIS PEDIS SYS MAX: 126 MMHG
BH CV LOWER ARTERIAL RIGHT GREAT TOE SYS MAX: 74 MMHG
BH CV LOWER ARTERIAL RIGHT POST TIBIAL SYS MAX: 119 MMHG
BH CV LOWER ARTERIAL RIGHT TBI RATIO: 0.65
UPPER ARTERIAL LEFT ARM BRACHIAL SYS MAX: 111 MMHG
UPPER ARTERIAL RIGHT ARM BRACHIAL SYS MAX: 114 MMHG

## 2021-06-16 ENCOUNTER — TELEPHONE (OUTPATIENT)
Dept: OBSTETRICS AND GYNECOLOGY | Facility: CLINIC | Age: 21
End: 2021-06-16

## 2021-06-16 RX ORDER — ESTRADIOL 1 MG/1
TABLET ORAL
Qty: 21 TABLET | Refills: 0 | Status: SHIPPED | OUTPATIENT
Start: 2021-06-16 | End: 2021-10-04

## 2021-06-16 NOTE — TELEPHONE ENCOUNTER
Called pt and she states that she just wants the breakthrough bleeding to stop. States that this has been going on for a week and it is not time for her period. Pt states that if Dr Aguilar wants her to switch to a different pill that's fine or what ever he may suggest. Pt states that we can leave a detailed message on her voicemail as she goes to work at 10.    Please advise.

## 2021-06-16 NOTE — TELEPHONE ENCOUNTER
pt is calling stating that she has been having break through bleeding while on Vienva birth control.   States she mentioned this at her last pap (October 2020), it has happened since her visit but this time it has went on for a week.     Pt states she goes into work at 10, and is okay with a voicemail being left,

## 2021-06-16 NOTE — TELEPHONE ENCOUNTER
Changing to a different birth control pill and adding estrogen day one through seven of each pill pack for the next three cycles    New Medications Ordered This Visit   Medications   • norethindrone-ethinyl estradiol (Necon , ,) 1-35 MG-MCG per tablet     Sig: Take 1 tablet by mouth Daily.     Dispense:  84 tablet     Refill:  2   • estradiol (Estrace) 1 MG tablet     Si pill daily day one through seven each pill pack for the next three cycles     Dispense:  21 tablet     Refill:  0

## 2021-06-18 ENCOUNTER — TELEPHONE (OUTPATIENT)
Dept: ULTRASOUND IMAGING | Facility: CLINIC | Age: 21
End: 2021-06-18

## 2021-06-18 NOTE — TELEPHONE ENCOUNTER
Pt called in wanting a call back to explain the new medication that was called in for her along with her birth control.

## 2021-10-04 ENCOUNTER — TELEPHONE (OUTPATIENT)
Dept: OBSTETRICS AND GYNECOLOGY | Facility: CLINIC | Age: 21
End: 2021-10-04

## 2021-10-04 RX ORDER — LEVONORGESTREL AND ETHINYL ESTRADIOL 0.1-0.02MG
1 KIT ORAL DAILY
Qty: 28 TABLET | Refills: 3 | Status: SHIPPED | OUTPATIENT
Start: 2021-10-04 | End: 2021-12-09 | Stop reason: SDUPTHER

## 2021-10-04 NOTE — TELEPHONE ENCOUNTER
PT need refill of Birth Control she has been using the Vienva. Dr. Aguilar changed it and the one he prescribed made her nauseous. She was fine when she changed back.  Confirmed pharmacy

## 2021-10-07 ENCOUNTER — OFFICE VISIT (OUTPATIENT)
Dept: FAMILY MEDICINE CLINIC | Facility: CLINIC | Age: 21
End: 2021-10-07

## 2021-10-07 VITALS
SYSTOLIC BLOOD PRESSURE: 122 MMHG | HEIGHT: 65 IN | DIASTOLIC BLOOD PRESSURE: 80 MMHG | BODY MASS INDEX: 19.46 KG/M2 | TEMPERATURE: 98.6 F | HEART RATE: 91 BPM | OXYGEN SATURATION: 98 % | WEIGHT: 116.8 LBS

## 2021-10-07 DIAGNOSIS — R11.0 NAUSEA: ICD-10-CM

## 2021-10-07 DIAGNOSIS — Z23 IMMUNIZATION DUE: ICD-10-CM

## 2021-10-07 DIAGNOSIS — F41.9 ANXIETY: Primary | ICD-10-CM

## 2021-10-07 PROCEDURE — 90471 IMMUNIZATION ADMIN: CPT | Performed by: NURSE PRACTITIONER

## 2021-10-07 PROCEDURE — 99214 OFFICE O/P EST MOD 30 MIN: CPT | Performed by: NURSE PRACTITIONER

## 2021-10-07 PROCEDURE — 90686 IIV4 VACC NO PRSV 0.5 ML IM: CPT | Performed by: NURSE PRACTITIONER

## 2021-10-07 RX ORDER — PROPRANOLOL HYDROCHLORIDE 20 MG/1
20 TABLET ORAL 2 TIMES DAILY PRN
Qty: 60 TABLET | Refills: 1 | Status: SHIPPED | OUTPATIENT
Start: 2021-10-07 | End: 2022-02-18

## 2021-10-07 RX ORDER — HYDROXYZINE HYDROCHLORIDE 10 MG/1
10 TABLET, FILM COATED ORAL 3 TIMES DAILY PRN
Qty: 90 TABLET | Refills: 1 | Status: SHIPPED | OUTPATIENT
Start: 2021-10-07

## 2021-10-07 RX ORDER — NORGESTIMATE AND ETHINYL ESTRADIOL 7DAYSX3 28
1 KIT ORAL DAILY
COMMUNITY
End: 2021-12-09

## 2021-10-07 RX ORDER — ONDANSETRON 4 MG/1
4 TABLET, ORALLY DISINTEGRATING ORAL EVERY 8 HOURS PRN
Qty: 20 TABLET | Refills: 1 | Status: SHIPPED | OUTPATIENT
Start: 2021-10-07 | End: 2022-02-18

## 2021-10-07 NOTE — PROGRESS NOTES
Chief Complaint  Chills, Nausea, and Anxiety    Subjective          David Coyle presents to [facility]     History of Present Illness  Answers for HPI/ROS submitted by the patient on 10/7/2021  Please describe your symptoms.: Nausea, havent been able to sleep or eat  Have you had these symptoms before?: Yes  How long have you been having these symptoms?: 1-4 days  Please describe any probable cause for these symptoms. 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  She also got  in July and is going through immigration currently.  He is getting ready to lose his job.  She started feeling nauseated.  She feels like she has a pit in her stomach.  She can't eat, because she feels nauseous.  She doesn't know if she feels nauseous, because she can't eat.  She only got an hour of sleep last night.  She has been anxious in the past, but this is much worse.  She is always anxious.  Her last block exams 1 month ago, she was spending 14 hours per day at school.  She wasn't eating, she was just spending her time studying.  She has been dealing with her husbands immigration the whole time.  This has all been worse since she started school.  She has been having heart racing.  She feels weak from not eating.  She took leftover Zofran, but it didn't really help.  She has not vomited.  She has been having diarrhea.  She has IBS that gets worse when she has anxiety.  Her stomach will cramp up and she feels like she can't breathe.  She has been evaluated by GI and had studies performed.  On Monday it happened.  All she ate was rice.  Since then she hasn't really wanted to eat.  She hadn't had it in a while.  She doesn't think she could be pregnant.  She just got off of her period a few days ago.  She just switched birth controls also.  She had been on this birth control previously.  She took a pregnancy test  "about a month ago and it was negative.  She doesn't feel like she has issues with depression.  She took Zoloft 3 years ago.  It gave her vivid, weird dreams.  She took Elavil in high school and it made her very tired.  She had chills last night and felt hot and cold, but when she took her temp it was 98.3.      Objective   Vital Signs:   /80   Pulse 91   Temp 98.6 °F (37 °C)   Ht 165.1 cm (65\")   Wt 53 kg (116 lb 12.8 oz)   SpO2 98%   BMI 19.44 kg/m²       Physical Exam  Vitals reviewed.   Constitutional:       Appearance: Normal appearance. She is normal weight.   HENT:      Head: Normocephalic and atraumatic.      Right Ear: Tympanic membrane, ear canal and external ear normal.      Left Ear: Tympanic membrane, ear canal and external ear normal.      Nose: Rhinorrhea ( clear) present.   Cardiovascular:      Rate and Rhythm: Normal rate and regular rhythm.      Heart sounds: Normal heart sounds.   Pulmonary:      Effort: Pulmonary effort is normal.      Breath sounds: Normal breath sounds.   Abdominal:      General: Bowel sounds are normal.      Palpations: Abdomen is soft.      Tenderness: There is no abdominal tenderness. There is no guarding or rebound.   Musculoskeletal:      Cervical back: Neck supple.   Skin:     General: Skin is warm and dry.   Neurological:      General: No focal deficit present.      Mental Status: She is alert and oriented to person, place, and time.   Psychiatric:         Mood and Affect: Mood normal.         Behavior: Behavior normal.         Thought Content: Thought content normal.         Judgment: Judgment normal.      Comments: Pt became tearful when talking about the stressful situations that she is experiencing.        Result Review :                 Assessment and Plan    Diagnoses and all orders for this visit:    1. Anxiety (Primary) -YESSI 7 score is 20.  Patient denies any issues with depression.  She has taken Zoloft in the past, but had side effects from it.  She " feels like she just needs as needed medication rather than daily.  Her anxiety is typically worse around her exams.  --Start propranolol 20 mg twice a day as needed for anxiety.  Discussed side effects and agrees to use.  --Start hydroxyzine 10 mg 3 times a day as needed for anxiety.  Discussed side effects and agrees to use.  --Provided patient with educational information on anxiety and ways to improve it.  --Encourage patient to sign up for counseling through [facility].  --If symptoms worsen or do not improve, return to clinic.  -     propranolol (INDERAL) 20 MG tablet; Take 1 tablet by mouth 2 (Two) Times a Day As Needed (anxiety/tachycardia).  Dispense: 60 tablet; Refill: 1  -     hydrOXYzine (ATARAX) 10 MG tablet; Take 1 tablet by mouth 3 (Three) Times a Day As Needed for Anxiety.  Dispense: 90 tablet; Refill: 1    2. Nausea -patient just got off her period.  She will take a pregnancy test when she gets home as she does not need to use the bathroom while she is in clinic.  --Zofran ODT 4 mg every 8 hours as needed for nausea or vomiting.  --Encourage patient to follow a bland diet.  --If symptoms worsen or do not improve, return to clinic.  -     ondansetron ODT (Zofran ODT) 4 MG disintegrating tablet; Place 1 tablet on the tongue Every 8 (Eight) Hours As Needed for Nausea or Vomiting.  Dispense: 20 tablet; Refill: 1    3. Immunization due  -     FluLaval/Fluarix >6 Months ([id number])        Follow Up   Return if symptoms worsen or fail to improve.  She will check her schedule and f/u within 2-4 weeks.  Patient was given instructions and counseling regarding her condition or for health maintenance advice. Please see specific information pulled into the AVS if appropriate.

## 2021-10-07 NOTE — PATIENT INSTRUCTIONS
Managing Anxiety, Adult  After being diagnosed with an anxiety disorder, you may be relieved to know why you have felt or behaved a certain way. You may also feel overwhelmed about the treatment ahead and what it will mean for your life. With care and support, you can manage this condition and recover from it.  How to manage lifestyle changes  Managing stress and anxiety    Stress is your body's reaction to life changes and events, both good and bad. Most stress will last just a few hours, but stress can be ongoing and can lead to more than just stress. Although stress can play a major role in anxiety, it is not the same as anxiety. Stress is usually caused by something external, such as a deadline, test, or competition. Stress normally passes after the triggering event has ended.   Anxiety is caused by something internal, such as imagining a terrible outcome or worrying that something will go wrong that will devastate you. Anxiety often does not go away even after the triggering event is over, and it can become long-term (chronic) worry. It is important to understand the differences between stress and anxiety and to manage your stress effectively so that it does not lead to an anxious response.  Talk with your health care provider or a counselor to learn more about reducing anxiety and stress. He or she may suggest tension reduction techniques, such as:  · Music therapy. This can include creating or listening to music that you enjoy and that inspires you.  · Mindfulness-based meditation. This involves being aware of your normal breaths while not trying to control your breathing. It can be done while sitting or walking.  · Centering prayer. This involves focusing on a word, phrase, or sacred image that means something to you and brings you peace.  · Deep breathing. To do this, expand your stomach and inhale slowly through your nose. Hold your breath for 3-5 seconds. Then exhale slowly, letting your stomach muscles  relax.  · Self-talk. This involves identifying thought patterns that lead to anxiety reactions and changing those patterns.  · Muscle relaxation. This involves tensing muscles and then relaxing them.  Choose a tension reduction technique that suits your lifestyle and personality. These techniques take time and practice. Set aside 5-15 minutes a day to do them. Therapists can offer counseling and training in these techniques. The training to help with anxiety may be covered by some insurance plans. Other things you can do to manage stress and anxiety include:  · Keeping a stress/anxiety diary. This can help you learn what triggers your reaction and then learn ways to manage your response.  · Thinking about how you react to certain situations. You may not be able to control everything, but you can control your response.  · Making time for activities that help you relax and not feeling guilty about spending your time in this way.  · Visual imagery and yoga can help you stay calm and relax.    Medicines  Medicines can help ease symptoms. Medicines for anxiety include:  · Anti-anxiety drugs.  · Antidepressants.  Medicines are often used as a primary treatment for anxiety disorder. Medicines will be prescribed by a health care provider. When used together, medicines, psychotherapy, and tension reduction techniques may be the most effective treatment.  Relationships  Relationships can play a big part in helping you recover. Try to spend more time connecting with trusted friends and family members. Consider going to couples counseling, taking family education classes, or going to family therapy. Therapy can help you and others better understand your condition.  How to recognize changes in your anxiety  Everyone responds differently to treatment for anxiety. Recovery from anxiety happens when symptoms decrease and stop interfering with your daily activities at home or work. This may mean that you will start to:  · Have  better concentration and focus. Worry will interfere less in your daily thinking.  · Sleep better.  · Be less irritable.  · Have more energy.  · Have improved memory.  It is important to recognize when your condition is getting worse. Contact your health care provider if your symptoms interfere with home or work and you feel like your condition is not improving.  Follow these instructions at home:  Activity  · Exercise. Most adults should do the following:  ? Exercise for at least 150 minutes each week. The exercise should increase your heart rate and make you sweat (moderate-intensity exercise).  ? Strengthening exercises at least twice a week.  · Get the right amount and quality of sleep. Most adults need 7-9 hours of sleep each night.  Lifestyle    · Eat a healthy diet that includes plenty of vegetables, fruits, whole grains, low-fat dairy products, and lean protein. Do not eat a lot of foods that are high in solid fats, added sugars, or salt.  · Make choices that simplify your life.  · Do not use any products that contain nicotine or tobacco, such as cigarettes, e-cigarettes, and chewing tobacco. If you need help quitting, ask your health care provider.  · Avoid caffeine, alcohol, and certain over-the-counter cold medicines. These may make you feel worse. Ask your pharmacist which medicines to avoid.    General instructions  · Take over-the-counter and prescription medicines only as told by your health care provider.  · Keep all follow-up visits as told by your health care provider. This is important.  Where to find support  You can get help and support from these sources:  · Self-help groups.  · Online and community organizations.  · A trusted spiritual leader.  · Couples counseling.  · Family education classes.  · Family therapy.  Where to find more information  You may find that joining a support group helps you deal with your anxiety. The following sources can help you locate counselors or support groups  near you:  · Mental Health [place]: www.mentalhealth[place]ica.net  · Anxiety and Depression Association of [place] (ADAA): www.adaa.org  · National [place] [employer] ([place]): www.edilberto.org  Contact a health care provider if you:  · Have a hard time staying focused or finishing daily tasks.  · Spend many hours a day feeling worried about everyday life.  · Become exhausted by worry.  · Start to have headaches, feel tense, or have nausea.  · Urinate more than normal.  · Have diarrhea.  Get help right away if you have:  · A racing heart and shortness of breath.  · Thoughts of hurting yourself or others.  If you ever feel like you may hurt yourself or others, or have thoughts about taking your own life, get help right away. You can go to your nearest emergency department or call:  · Your local emergency services (911 in the [place]  · A suicide crisis helpline, such as the National Suicide Prevention Lifeline at 1-345.797.8913. This is open 24 hours a day.  Summary  · Taking steps to learn and use tension reduction techniques can help calm you and help prevent triggering an anxiety reaction.  · When used together, medicines, psychotherapy, and tension reduction techniques may be the most effective treatment.  · Family, friends, and partners can play a big part in helping you recover from an anxiety disorder.  This information is not intended to replace advice given to you by your health care provider. Make sure you discuss any questions you have with your health care provider.  Document Revised: 05/19/2020 Document Reviewed: 05/19/2020  Elsevier Patient Education © 2021 [employer] Inc.    Mindfulness-Based Stress Reduction  Mindfulness-based stress reduction (MBSR) is a program that helps people learn to practice mindfulness. Mindfulness is the practice of intentionally paying attention to the present moment. It can be learned and practiced through techniques such as education, breathing exercises, meditation, and  yoga. MBSR includes several mindfulness techniques in one program.  MBSR works best when you understand the treatment, are willing to try new things, and can commit to spending time practicing what you learn. MBSR training may include learning about:  · How your emotions, thoughts, and reactions affect your body.  · New ways to respond to things that cause negative thoughts to start (triggers).  · How to notice your thoughts and let go of them.  · Practicing awareness of everyday things that you normally do without thinking.  · The techniques and goals of different types of meditation.  What are the benefits of MBSR?  MBSR can have many benefits, which include helping you to:  · Develop self-awareness. This refers to knowing and understanding yourself.  · Learn skills and attitudes that help you to participate in your own health care.  · Learn new ways to care for yourself.  · Be more accepting about how things are, and let things go.  · Be less judgmental and approach things with an open mind.  · Be patient with yourself and trust yourself more.  MBSR has also been shown to:  · Reduce negative emotions, such as depression and anxiety.  · Improve memory and focus.  · Change how you sense and approach pain.  · Boost your body's ability to fight infections.  · Help you connect better with other people.  · Improve your sense of well-being.  Follow these instructions at home:    · Find a local in-person or online MBSR program.  · Set aside some time regularly for mindfulness practice.  · Find a mindfulness practice that works best for you. This may include one or more of the following:  ? Meditation. Meditation involves focusing your mind on a certain thought or activity.  ? Breathing awareness exercises. These help you to stay present by focusing on your breath.  ? Body scan. For this practice, you lie down and pay attention to each part of your body from head to toe. You can identify tension and soreness and  intentionally relax parts of your body.  ? Yoga. Yoga involves stretching and breathing, and it can improve your ability to move and be flexible. It can also provide an experience of testing your body's limits, which can help you release stress.  ? Mindful eating. This way of eating involves focusing on the taste, texture, color, and smell of each bite of food. Because this slows down eating and helps you feel full sooner, it can be an important part of a weight-loss plan.  · Find a podcast or recording that provides guidance for breathing awareness, body scan, or meditation exercises. You can listen to these any time when you have a free moment to rest without distractions.  · Follow your treatment plan as told by your health care provider. This may include taking regular medicines and making changes to your diet or lifestyle as recommended.  How to practice mindfulness  To do a basic awareness exercise:  · Find a comfortable place to sit.  · Pay attention to the present moment. Observe your thoughts, feelings, and surroundings just as they are.  · Avoid placing judgment on yourself, your feelings, or your surroundings. Make note of any judgment that comes up, and let it go.  · Your mind may wander, and that is okay. Make note of when your thoughts drift, and return your attention to the present moment.  To do basic mindfulness meditation:  · Find a comfortable place to sit. This may include a stable chair or a firm floor cushion.  ? Sit upright with your back straight. Let your arms fall next to your side with your hands resting on your legs.  ? If sitting in a chair, rest your feet flat on the floor.  ? If sitting on a cushion, cross your legs in front of you.  · Keep your head in a neutral position with your chin dropped slightly. Relax your jaw and rest the tip of your tongue on the roof of your mouth. Drop your gaze to the floor. You can close your eyes if you like.  · Breathe normally and pay attention to  your breath. Feel the air moving in and out of your nose. Feel your belly expanding and relaxing with each breath.  · Your mind may wander, and that is okay. Make note of when your thoughts drift, and return your attention to your breath.  · Avoid placing judgment on yourself, your feelings, or your surroundings. Make note of any judgment or feelings that come up, let them go, and bring your attention back to your breath.  · When you are ready, lift your gaze or open your eyes. Pay attention to how your body feels after the meditation.  Where to find more information  You can find more information about MBSR from:  · Your health care provider.  · Community-based meditation centers or programs.  · Programs offered near you.  Summary  · Mindfulness-based stress reduction (MBSR) is a program that teaches you how to intentionally pay attention to the present moment. It is used with other treatments to help you cope better with daily stress, emotions, and pain.  · MBSR focuses on developing self-awareness, which allows you to respond to life stress without judgment or negative emotions.  · MBSR programs may involve learning different mindfulness practices, such as breathing exercises, meditation, yoga, body scan, or mindful eating. Find a mindfulness practice that works best for you, and set aside time for it on a regular basis.  This information is not intended to replace advice given to you by your health care provider. Make sure you discuss any questions you have with your health care provider.  Document Revised: 02/22/2021 Document Reviewed: 04/26/2018  Elsevier Patient Education © 2021 Elsevier Inc.

## 2021-12-09 RX ORDER — LEVONORGESTREL AND ETHINYL ESTRADIOL 0.1-0.02MG
1 KIT ORAL DAILY
Qty: 28 TABLET | Refills: 2 | Status: SHIPPED | OUTPATIENT
Start: 2021-12-09 | End: 2021-12-09 | Stop reason: SDUPTHER

## 2021-12-09 RX ORDER — LEVONORGESTREL AND ETHINYL ESTRADIOL 0.1-0.02MG
1 KIT ORAL DAILY
Qty: 28 TABLET | Refills: 2 | Status: SHIPPED | OUTPATIENT
Start: 2021-12-09 | End: 2022-02-18 | Stop reason: SDUPTHER

## 2021-12-09 NOTE — TELEPHONE ENCOUNTER
Pt called back and said she wants the Vienva birth control called in that's the one she takes the other one made her nauseous.

## 2021-12-09 NOTE — TELEPHONE ENCOUNTER
Patient had to push her annual out to February due to Dr. Aguilar being out of the office for the holiday and her work schedule and needs refills for her birth control sent to her pharmacy to get her to her next appointment.  I advised her that I would send a message back and get it sent in.

## 2021-12-10 NOTE — TELEPHONE ENCOUNTER
Attempted to contact patient to let her know that her prescription was sent to her pharmacy.  There was no answer and a message for her to give the office a call back.

## 2022-01-20 PROCEDURE — 87529 HSV DNA AMP PROBE: CPT | Performed by: FAMILY MEDICINE

## 2022-01-27 ENCOUNTER — TELEPHONE (OUTPATIENT)
Dept: URGENT CARE | Facility: CLINIC | Age: 22
End: 2022-01-27

## 2022-01-27 NOTE — TELEPHONE ENCOUNTER
01/27/2022 called and left message for patient to return our call if she has any wuestions. Patient has seen results on my chart

## 2022-02-17 RX ORDER — LEVONORGESTREL AND ETHINYL ESTRADIOL 0.1-0.02MG
1 KIT ORAL DAILY
Qty: 84 TABLET | Refills: 4 | Status: CANCELLED | OUTPATIENT
Start: 2022-02-17

## 2022-02-18 ENCOUNTER — OFFICE VISIT (OUTPATIENT)
Dept: OBSTETRICS AND GYNECOLOGY | Facility: CLINIC | Age: 22
End: 2022-02-18

## 2022-02-18 ENCOUNTER — LAB (OUTPATIENT)
Dept: LAB | Facility: HOSPITAL | Age: 22
End: 2022-02-18

## 2022-02-18 VITALS
DIASTOLIC BLOOD PRESSURE: 62 MMHG | RESPIRATION RATE: 14 BRPM | WEIGHT: 122 LBS | BODY MASS INDEX: 20.3 KG/M2 | SYSTOLIC BLOOD PRESSURE: 104 MMHG

## 2022-02-18 DIAGNOSIS — R35.0 URINARY FREQUENCY: ICD-10-CM

## 2022-02-18 DIAGNOSIS — Z01.419 WELL WOMAN EXAM: Primary | ICD-10-CM

## 2022-02-18 DIAGNOSIS — Z71.85 VACCINE COUNSELING: ICD-10-CM

## 2022-02-18 PROBLEM — G43.909 MIGRAINE WITHOUT STATUS MIGRAINOSUS, NOT INTRACTABLE: Status: RESOLVED | Noted: 2020-07-31 | Resolved: 2022-02-18

## 2022-02-18 PROBLEM — L81.9 DISCOLORATION OF SKIN OF TOE: Status: RESOLVED | Noted: 2021-03-02 | Resolved: 2022-02-18

## 2022-02-18 LAB
BILIRUB UR QL STRIP: NEGATIVE
CLARITY UR: CLEAR
COLOR UR: YELLOW
GLUCOSE UR STRIP-MCNC: NEGATIVE MG/DL
HGB UR QL STRIP.AUTO: NEGATIVE
KETONES UR QL STRIP: ABNORMAL
LEUKOCYTE ESTERASE UR QL STRIP.AUTO: NEGATIVE
NITRITE UR QL STRIP: NEGATIVE
PH UR STRIP.AUTO: 5.5 [PH] (ref 5–8)
PROT UR QL STRIP: NEGATIVE
SP GR UR STRIP: 1.02 (ref 1–1.03)
UROBILINOGEN UR QL STRIP: ABNORMAL

## 2022-02-18 PROCEDURE — 81003 URINALYSIS AUTO W/O SCOPE: CPT

## 2022-02-18 PROCEDURE — 99395 PREV VISIT EST AGE 18-39: CPT | Performed by: OBSTETRICS & GYNECOLOGY

## 2022-02-18 RX ORDER — LEVONORGESTREL AND ETHINYL ESTRADIOL 0.1-0.02MG
1 KIT ORAL DAILY
Qty: 84 TABLET | Refills: 4 | Status: SHIPPED | OUTPATIENT
Start: 2022-02-18 | End: 2023-03-22 | Stop reason: SDUPTHER

## 2022-02-18 NOTE — PROGRESS NOTES
Subjective   Chief Complaint   Patient presents with   • Gynecologic Exam     David Coyle is a 21 y.o. year old  presenting to be seen for her annual exam. Patient was treated for a yeast infection 1 month ago at Urgent Care. She went to [facility] 3 weeks ago and was treated for a UTI. She finished her Macrobid and is continuing to feel urinary pressure and frequency.  As part of the work-up she was tested for STDs both  in the urgent treatment center and those test came back clear.    She does want to talk about changing to a different birth control pill.  She gets migraines infrequently but has heard that the presence of migraines or contraindication to an estrogen-containing birth control pill    SEXUAL Hx:  She is currently sexually active.  In the past year there there has been NO new sexual partners.    Condoms are never used.  She would not like to be screened for STD's at today's exam.  Current birth control method: OCP (estrogen/progesterone).  She is happy with her current method of contraception and does want to discuss alternative methods of contraception.  MENSTRUAL Hx:  Patient's last menstrual period was 2022 (approximate).  In the past 6 months her cycles have been regular, predictable and occur monthly.  Her menstrual flow is typically normal.   Each month on average there are roughly 0 day(s) of very heavy flow.  Intermenstrual bleeding is absent.    Post-coital bleeding is absent.  Dysmenorrhea: is not affecting her activities of daily living  PMS: is not affecting her activities of daily living  Her cycles are not a source of concern for her that she wishes to discuss today.  HEALTH Hx:  She exercises regularly: no (and has no plans to become more active).  She wears her seat belt: yes.  She has concerns about domestic violence: no.  OTHER THINGS SHE WANTS TO DISCUSS TODAY:  Nothing else    The following portions of the patient's history were reviewed and  updated as appropriate:problem list, current medications, allergies, past family history, past medical history, past social history and past surgical history.    Social History    Tobacco Use      Smoking status: Never Smoker      Smokeless tobacco: Never Used    Review of Systems  Constitutional POS: nothing reported    NEG: anorexia or night sweats   Genitourinary POS: frequency and urgency    NEG: dysuria or hematuria      Gastointestinal POS: bloating    NEG: bloating, change in bowel habits, melena or reflux symptoms   Integument POS: nothing reported    NEG: moles that are changing in size, shape, color or rashes   Breast POS: nothing reported    NEG: persistent breast lump, skin dimpling or nipple discharge        Objective   /62   Resp 14   Wt 55.3 kg (122 lb)   LMP 01/19/2022 (Approximate)   Breastfeeding No   BMI 20.30 kg/m²     General:  well developed; well nourished  no acute distress   Skin:  No suspicious lesions seen   Thyroid: normal to inspection and palpation   Breasts:  Examined in supine position  Symmetric without masses or skin dimpling  Nipples normal without inversion, lesions or discharge  There are no palpable axillary nodes   Abdomen: soft, non-tender; no masses  no umbilical or inguinal hernias are present  no hepato-splenomegaly   Pelvis: Clinical staff was present for exam  External genitalia:  normal appearance of the external genitalia including Bartholin's and Rhodhiss's glands.  :  urethral meatus normal;  Vaginal:  normal pink mucosa without prolapse or lesions.  Cervix:  normal appearance.  Uterus:  normal size, shape and consistency.  Adnexa:  normal bimanual exam of the adnexa.  Rectal:  digital rectal exam not performed; anus visually normal appearing.        Assessment   1. Normal GYN exam  2. Migraine h/a's - not significantly affecting her activities of daily living.  3. Urinary frequency and urgency - This is a new finding that does need to be worked up  further  4. She is up to date on all relevant gynecologic and colorectal screenings     Plan   1. Pap was not done today.  I explained to David that the recommendations for Pap smear interval in a low risk patient has lengthened to 3 years time.  I told David she still needs to be seen in our office yearly for a full physical including breast and pelvic exam.  2. The following tests were ordered today: UA with culture if indicated.  It was explained to David that all lab test should be back within the one week after they are performed. She will be notified about the results, regardless of the findings. If she has not been contacted by the office within 2 weeks after the test has been performed, it is her responsibility to contact us to learn about her results.  3. Explained that estrogen-containing birth control pills are not contraindicated unless there is significant migraine with aura.  If she is otherwise clinically stable I do not think an estrogen containing birth control pill is contraindicated  4. Her vaccine record was reviewed and updated.  5. I discussed with David that she may be behind on needed vaccinations for HPV (Gardasil-9).  She may be able to obtain these vaccinations at her local pharmacy OR speak about obtaining them with her primary care.  If she does obtain her vaccines, I have asked David to let us know the date each vaccine was obtained so that her medical record could be updated in our system.  6. The importance of keeping all planned follow-up and taking all medications as prescribed was emphasized.  7. Follow up for annual exam 1 year    New Medications Ordered This Visit   Medications   • levonorgestrel-ethinyl estradiol (AVIANE,ALESSE,LESSINA) 0.1-20 MG-MCG per tablet     Sig: Take 1 tablet by mouth Daily.     Dispense:  84 tablet     Refill:  4          This note was electronically signed.    Noman Aguilar M.D.  February 18, 2022    Part of this note may be an  electronic transcription/translation of spoken language to printed text using the Dragon Dictation System.

## 2022-04-26 ENCOUNTER — LAB (OUTPATIENT)
Dept: LAB | Facility: HOSPITAL | Age: 22
End: 2022-04-26

## 2022-04-26 ENCOUNTER — OFFICE VISIT (OUTPATIENT)
Dept: FAMILY MEDICINE CLINIC | Facility: CLINIC | Age: 22
End: 2022-04-26

## 2022-04-26 VITALS
OXYGEN SATURATION: 99 % | BODY MASS INDEX: 20.56 KG/M2 | HEART RATE: 101 BPM | HEIGHT: 65 IN | SYSTOLIC BLOOD PRESSURE: 112 MMHG | WEIGHT: 123.4 LBS | DIASTOLIC BLOOD PRESSURE: 76 MMHG | TEMPERATURE: 98.6 F

## 2022-04-26 DIAGNOSIS — R10.12 LUQ PAIN: ICD-10-CM

## 2022-04-26 DIAGNOSIS — R55 SYNCOPE, UNSPECIFIED SYNCOPE TYPE: ICD-10-CM

## 2022-04-26 DIAGNOSIS — R63.0 LOSS OF APPETITE: ICD-10-CM

## 2022-04-26 DIAGNOSIS — Z90.49 S/P CHOLECYSTECTOMY: ICD-10-CM

## 2022-04-26 DIAGNOSIS — K58.0 IRRITABLE BOWEL SYNDROME WITH DIARRHEA: Primary | ICD-10-CM

## 2022-04-26 LAB
ALBUMIN SERPL-MCNC: 4.7 G/DL (ref 3.5–5.2)
ALBUMIN/GLOB SERPL: 2.2 G/DL
ALP SERPL-CCNC: 60 U/L (ref 39–117)
ALT SERPL W P-5'-P-CCNC: 17 U/L (ref 1–33)
ANION GAP SERPL CALCULATED.3IONS-SCNC: 14 MMOL/L (ref 5–15)
AST SERPL-CCNC: 19 U/L (ref 1–32)
BASOPHILS # BLD AUTO: 0.05 10*3/MM3 (ref 0–0.2)
BASOPHILS NFR BLD AUTO: 0.8 % (ref 0–1.5)
BILIRUB SERPL-MCNC: 1.8 MG/DL (ref 0–1.2)
BILIRUB UR QL STRIP: NEGATIVE
BUN SERPL-MCNC: 8 MG/DL (ref 6–20)
BUN/CREAT SERPL: 10.1 (ref 7–25)
CALCIUM SPEC-SCNC: 9.4 MG/DL (ref 8.6–10.5)
CHLORIDE SERPL-SCNC: 100 MMOL/L (ref 98–107)
CLARITY UR: CLEAR
CO2 SERPL-SCNC: 26 MMOL/L (ref 22–29)
COLOR UR: YELLOW
CREAT SERPL-MCNC: 0.79 MG/DL (ref 0.57–1)
DEPRECATED RDW RBC AUTO: 39.4 FL (ref 37–54)
EGFRCR SERPLBLD CKD-EPI 2021: 107.9 ML/MIN/1.73
EOSINOPHIL # BLD AUTO: 0.06 10*3/MM3 (ref 0–0.4)
EOSINOPHIL NFR BLD AUTO: 0.9 % (ref 0.3–6.2)
ERYTHROCYTE [DISTWIDTH] IN BLOOD BY AUTOMATED COUNT: 11.4 % (ref 12.3–15.4)
GLOBULIN UR ELPH-MCNC: 2.1 GM/DL
GLUCOSE SERPL-MCNC: 86 MG/DL (ref 65–99)
GLUCOSE UR STRIP-MCNC: NEGATIVE MG/DL
HCT VFR BLD AUTO: 44 % (ref 34–46.6)
HGB BLD-MCNC: 15.1 G/DL (ref 12–15.9)
HGB UR QL STRIP.AUTO: NEGATIVE
IMM GRANULOCYTES # BLD AUTO: 0.01 10*3/MM3 (ref 0–0.05)
IMM GRANULOCYTES NFR BLD AUTO: 0.2 % (ref 0–0.5)
IRON 24H UR-MRATE: 150 MCG/DL (ref 37–145)
IRON SATN MFR SERPL: 35 % (ref 20–50)
KETONES UR QL STRIP: NEGATIVE
LEUKOCYTE ESTERASE UR QL STRIP.AUTO: NEGATIVE
LIPASE SERPL-CCNC: 28 U/L (ref 13–60)
LYMPHOCYTES # BLD AUTO: 2.5 10*3/MM3 (ref 0.7–3.1)
LYMPHOCYTES NFR BLD AUTO: 39 % (ref 19.6–45.3)
MCH RBC QN AUTO: 32.6 PG (ref 26.6–33)
MCHC RBC AUTO-ENTMCNC: 34.3 G/DL (ref 31.5–35.7)
MCV RBC AUTO: 95 FL (ref 79–97)
MONOCYTES # BLD AUTO: 0.33 10*3/MM3 (ref 0.1–0.9)
MONOCYTES NFR BLD AUTO: 5.1 % (ref 5–12)
NEUTROPHILS NFR BLD AUTO: 3.46 10*3/MM3 (ref 1.7–7)
NEUTROPHILS NFR BLD AUTO: 54 % (ref 42.7–76)
NITRITE UR QL STRIP: NEGATIVE
NRBC BLD AUTO-RTO: 0 /100 WBC (ref 0–0.2)
PH UR STRIP.AUTO: 6 [PH] (ref 5–8)
PLATELET # BLD AUTO: 221 10*3/MM3 (ref 140–450)
PMV BLD AUTO: 10.7 FL (ref 6–12)
POTASSIUM SERPL-SCNC: 3.9 MMOL/L (ref 3.5–5.2)
PROT SERPL-MCNC: 6.8 G/DL (ref 6–8.5)
PROT UR QL STRIP: NEGATIVE
RBC # BLD AUTO: 4.63 10*6/MM3 (ref 3.77–5.28)
SODIUM SERPL-SCNC: 140 MMOL/L (ref 136–145)
SP GR UR STRIP: 1.01 (ref 1–1.03)
TIBC SERPL-MCNC: 425 MCG/DL (ref 298–536)
TRANSFERRIN SERPL-MCNC: 285 MG/DL (ref 200–360)
TSH SERPL DL<=0.05 MIU/L-ACNC: 1.41 UIU/ML (ref 0.27–4.2)
UROBILINOGEN UR QL STRIP: NORMAL
WBC NRBC COR # BLD: 6.41 10*3/MM3 (ref 3.4–10.8)

## 2022-04-26 PROCEDURE — 83690 ASSAY OF LIPASE: CPT

## 2022-04-26 PROCEDURE — 84443 ASSAY THYROID STIM HORMONE: CPT

## 2022-04-26 PROCEDURE — 84466 ASSAY OF TRANSFERRIN: CPT

## 2022-04-26 PROCEDURE — 99214 OFFICE O/P EST MOD 30 MIN: CPT | Performed by: PHYSICIAN ASSISTANT

## 2022-04-26 PROCEDURE — 36415 COLL VENOUS BLD VENIPUNCTURE: CPT

## 2022-04-26 PROCEDURE — 85025 COMPLETE CBC W/AUTO DIFF WBC: CPT

## 2022-04-26 PROCEDURE — 81003 URINALYSIS AUTO W/O SCOPE: CPT

## 2022-04-26 PROCEDURE — 80053 COMPREHEN METABOLIC PANEL: CPT

## 2022-04-26 PROCEDURE — 83540 ASSAY OF IRON: CPT

## 2022-04-26 NOTE — PROGRESS NOTES
"Chief Complaint   Patient presents with   • Abdominal Pain   • Loss of Consciousness   • Mass     Lower Back Right Side       HPI      David Coyle is a 21 y.o. female who presents for Abdominal Pain, Loss of Consciousness, and Mass (Lower Back Right Side)    Patient reports left upper quadrant pain that is sharp, stabbing and deep.  She has had this pain episodically for years.  Recently the pain has worsened.  Had cholecystectomy a few years ago as they felt this was cause of pain but pain did not improve.  Has been to see GI specialists in the past with no diagnosis.  Reports having EGD and colonoscopy.  She is unsure if she had a CT abdo/pelvis or ultrasound imaging.  Reports that when pain hits, she feels nauseous and has even passed out.  Last episode of syncope was a few days ago while she was cutting her husbands hair.  Did not go to ER/UTC.  Did not hit her head.  Denies syncope with activity.  States it is always related to the pain.  Reports episodic diarrhea or constipation.  No blood in stool.  No vomiting.  Denies any associated food with symptoms.  Pain usually proceeds eating.  Reports loss of appetite and \"fear\" of eating due to pain that occurs at times.    Does report history of iron deficiency anemia.  Not taking iron supplements at this time.  Denies any chest pain or shortness of breath.    Past Medical History:   Diagnosis Date   • Irritable bowel - diarrhea predominant 2015   • Migraine without status migrainosus, not intractable 2014       Past Surgical History:   Procedure Laterality Date   • LAPAROSCOPIC CHOLECYSTECTOMY  02/2016   • TONSILLECTOMY  07/2020       Family History   Problem Relation Age of Onset   • Migraines Mother    • Depression Mother    • Irritable bowel syndrome Mother    • Arthritis Maternal Grandmother    • Hyperlipidemia Maternal Grandfather    • Diabetes Maternal Grandfather    • Arthritis Paternal Grandmother    • Colon cancer Paternal Grandmother    • " "Colon cancer Paternal Grandfather 70   • Heart disease Paternal Grandfather    • Hyperlipidemia Paternal Grandfather    • Breast cancer Neg Hx    • Ovarian cancer Neg Hx    • Prostate cancer Neg Hx        Social History     Socioeconomic History   • Marital status:    Tobacco Use   • Smoking status: Never Smoker   • Smokeless tobacco: Never Used   Vaping Use   • Vaping Use: Never used   Substance and Sexual Activity   • Alcohol use: Not Currently   • Drug use: Never   • Sexual activity: Yes     Partners: Male     Birth control/protection: OCP       Allergies   Allergen Reactions   • Bactrim [Sulfamethoxazole-Trimethoprim] Swelling     THROAT    • Nitrofurantoin Other (See Comments)     Abdominal pain   • Toradol [Ketorolac Tromethamine] Hives       ROS    Review of Systems   Constitutional: Positive for appetite change. Negative for chills, fever and unexpected weight loss.   Respiratory: Negative for shortness of breath.    Cardiovascular: Negative for chest pain.   Gastrointestinal: Positive for abdominal pain, diarrhea, nausea and indigestion. Negative for blood in stool, constipation and vomiting.   Genitourinary: Negative for dysuria, frequency and hematuria.   Musculoskeletal: Negative for arthralgias and myalgias.   Neurological: Positive for syncope and light-headedness.       Vitals:    04/26/22 1345   BP: 112/76   BP Location: Left arm   Patient Position: Sitting   Cuff Size: Adult   Pulse: 101   Temp: 98.6 °F (37 °C)   SpO2: 99%   Weight: 56 kg (123 lb 6.4 oz)   Height: 165.1 cm (65\")   PainSc: 0-No pain     Body mass index is 20.53 kg/m².    Current Outpatient Medications on File Prior to Visit   Medication Sig Dispense Refill   • hydrOXYzine (ATARAX) 10 MG tablet Take 1 tablet by mouth 3 (Three) Times a Day As Needed for Anxiety. 90 tablet 1   • hyoscyamine (LEVSIN) 0.125 MG SL tablet Take 0.125 mg by mouth Every 4 (Four) Hours As Needed for Cramping.     • levonorgestrel-ethinyl estradiol " (AVIANE,ALESSE,LESSINA) 0.1-20 MG-MCG per tablet Take 1 tablet by mouth Daily. 84 tablet 4   • multivitamin (DAILY DOMENICO) tablet tablet Take  by mouth Daily.       No current facility-administered medications on file prior to visit.       Results for orders placed or performed in visit on 02/18/22   Urinalysis With Culture If Indicated - Urine, Clean Catch    Specimen: Urine, Clean Catch   Result Value Ref Range    Color, UA Yellow Yellow, Straw    Appearance, UA Clear Clear    pH, UA 5.5 5.0 - 8.0    Specific Gravity, UA 1.024 1.005 - 1.030    Glucose, UA Negative Negative    Ketones, UA Trace (A) Negative    Bilirubin, UA Negative Negative    Blood, UA Negative Negative    Protein, UA Negative Negative    Leuk Esterase, UA Negative Negative    Nitrite, UA Negative Negative    Urobilinogen, UA 0.2 E.U./dL 0.2 - 1.0 E.U./dL       PE    Physical Exam  Vitals reviewed.   Constitutional:       General: She is not in acute distress.     Appearance: Normal appearance. She is well-developed and underweight. She is not ill-appearing or diaphoretic.   HENT:      Head: Normocephalic and atraumatic.   Eyes:      Extraocular Movements: Extraocular movements intact.      Conjunctiva/sclera: Conjunctivae normal.   Cardiovascular:      Rate and Rhythm: Normal rate and regular rhythm.      Heart sounds: Normal heart sounds.   Pulmonary:      Effort: No respiratory distress.   Abdominal:      General: Abdomen is flat. Bowel sounds are normal.      Palpations: Abdomen is soft.      Tenderness: There is abdominal tenderness in the epigastric area and left upper quadrant. There is no right CVA tenderness, left CVA tenderness or guarding.   Musculoskeletal:         General: Normal range of motion.      Cervical back: Normal range of motion.      Right lower leg: No edema.      Left lower leg: No edema.   Skin:     General: Skin is warm.      Findings: No erythema or rash.   Neurological:      General: No focal deficit present.       Mental Status: She is alert.   Psychiatric:         Attention and Perception: She is attentive.         Mood and Affect: Mood normal.         Speech: Speech normal.         Behavior: Behavior normal. Behavior is cooperative.         Thought Content: Thought content normal.         Judgment: Judgment normal.          A/P    Diagnoses and all orders for this visit:    1. Irritable bowel syndrome with diarrhea (Primary)  -     Ambulatory Referral to Gastroenterology    2. LUQ pain  -     Ambulatory Referral to Gastroenterology  -     Lipase; Future    3. S/P cholecystectomy  -     Ambulatory Referral to Gastroenterology    4. Loss of appetite  -     Ambulatory Referral to Gastroenterology  Has multiple complaints that have been ongoing for years.  First time we have seen her in our office for these issues.  She did have a recent episode of severe epigastric/LUQ pain that preceded an episode of syncope.  She reports GI work-up in the past and I encouraged patient to obtain records prior to seeing [facility].    5. Syncope, unspecified syncope type  -     CBC Auto Differential; Future  -     Comprehensive Metabolic Panel; Future  -     TSH Rfx On Abnormal To Free T4; Future  -     Iron Profile; Future  -     Urinalysis With Culture If Indicated - Urine, Clean Catch; Future  Syncope is only related to abdominal pain.  Not associated with activity.  Possibly vasovagal?  Discussed going to ER if she has another episode of syncope.  Will check labs.       Plan of care reviewed with patient at the conclusion of today's visit. Education was provided regarding diagnosis, management and any prescribed or recommended OTC medications.  Patient verbalizes understanding of and agreement with management plan.    Return in about 4 weeks (around 5/24/2022) for Annual physical.     Rosalba Palacios PA-C    Answers for HPI/ROS submitted by the patient on 4/26/2022  What is the primary reason for your visit?: Abdominal Pain

## 2022-05-03 ENCOUNTER — OFFICE VISIT (OUTPATIENT)
Dept: GASTROENTEROLOGY | Facility: CLINIC | Age: 22
End: 2022-05-03

## 2022-05-03 VITALS
SYSTOLIC BLOOD PRESSURE: 110 MMHG | TEMPERATURE: 97.6 F | WEIGHT: 124 LBS | HEIGHT: 65 IN | DIASTOLIC BLOOD PRESSURE: 60 MMHG | HEART RATE: 76 BPM | OXYGEN SATURATION: 99 % | BODY MASS INDEX: 20.66 KG/M2

## 2022-05-03 DIAGNOSIS — G43.D1 INTRACTABLE ABDOMINAL MIGRAINE: ICD-10-CM

## 2022-05-03 DIAGNOSIS — R10.12 LEFT UPPER QUADRANT ABDOMINAL PAIN: ICD-10-CM

## 2022-05-03 DIAGNOSIS — K58.2 IRRITABLE BOWEL SYNDROME WITH BOTH CONSTIPATION AND DIARRHEA: Primary | ICD-10-CM

## 2022-05-03 DIAGNOSIS — K21.9 GASTROESOPHAGEAL REFLUX DISEASE, UNSPECIFIED WHETHER ESOPHAGITIS PRESENT: ICD-10-CM

## 2022-05-03 PROCEDURE — 99215 OFFICE O/P EST HI 40 MIN: CPT | Performed by: NURSE PRACTITIONER

## 2022-05-03 RX ORDER — SUMATRIPTAN 20 MG/1
1 SPRAY NASAL
Qty: 1 EACH | Refills: 2 | Status: SHIPPED | OUTPATIENT
Start: 2022-05-03

## 2022-05-03 RX ORDER — AMITRIPTYLINE HYDROCHLORIDE 10 MG/1
10 TABLET, FILM COATED ORAL NIGHTLY
Qty: 30 TABLET | Refills: 5 | Status: SHIPPED | OUTPATIENT
Start: 2022-05-03 | End: 2023-01-12

## 2022-05-03 NOTE — PROGRESS NOTES
New Patient Consultation     Patient Name: David Coyle  : 2000   MRN: [id number]     Chief Complaint:    Chief Complaint   Patient presents with   • Abdominal Pain   • Diarrhea       History of Present Illness: David Coyle is a 21 y.o. female who is here today for a Gastroenterology Consultation for abdominal pain.    Helen began having generalized abdominal pain associated with diarrhea several years ago.  Eventually, the 2 symptoms  and her abdominal pain became more localized.  She will now have left upper quadrant pain that can last several hours and be severe  (8 or 9 on the numeric pain scale) .     Tried Low FODMAP diet for 18 month but no change in pain.  Pain is intermittent and usually after eating.  There is early satiety. Levsin is helpful for the pain.There is nausea with the pain. Symptoms occur about 7-8 days a month. It does not correlate with her menstrual cycle. The pain does not occur in predictable cycles.  Currently she is having about 1 Bm a day - type 1-4 on the bristol scale.  Her weight fluctuates. There is no blood in the stool.    She does have frequent headaches- about 10 headache days a month with 1 day being a migraine.  In the past she has seen a migraine clinic and was started on amitriptyline but had to discontinue this due to fatigue.  There is occasional heartburn (about once a week). No dysphagia.  Was previously on PPIs but this was very difficult to come off of so she wishes to avoid.  Taking Pepcid as needed.    Patient had a fairly thorough work-up at [facility] between  and .  Abdominal surgical history includes cholecystectomy  Had 2 EGD's and 1 colonoscopy and this was normal beside H pylori which was treated and documented as cured. (I do not have a copy of this to review)    CT ENTEROGRAPHY (2017 14:23)  US Non-ob Transvaginal (2017 10:21)  CT Abdomen Pelvis With Contrast (2017  09:25)  NM HEPATOBILIARY W GBEF (01/20/2016 15:28)  US RIGHT UPPER QUADRANT (01/05/2016 20:12)    Lab work-up negative (normal C3 and C4 complement, SANDY, C1 esterase inhibitor, TTG, CRP, TSH, CBC, CMP).    Colon cancer on both sides of her family in grandparents in their 60's.  Uncle has UC. Mother has lupus.    Subjective      Review of Systems:   Review of Systems   Constitutional: Negative for appetite change and unexpected weight loss.   HENT: Negative for trouble swallowing.    Gastrointestinal: Positive for abdominal pain, constipation, diarrhea, GERD and indigestion. Negative for abdominal distention, anal bleeding, blood in stool, nausea, rectal pain and vomiting.       Past Medical History:   Past Medical History:   Diagnosis Date   • Irritable bowel - diarrhea predominant 2015   • Migraine without status migrainosus, not intractable 2014       Past Surgical History:   Past Surgical History:   Procedure Laterality Date   • LAPAROSCOPIC CHOLECYSTECTOMY  02/2016   • TONSILLECTOMY  07/2020       Family History:   Family History   Problem Relation Age of Onset   • Migraines Mother    • Depression Mother    • Irritable bowel syndrome Mother    • Arthritis Maternal Grandmother    • Hyperlipidemia Maternal Grandfather    • Diabetes Maternal Grandfather    • Arthritis Paternal Grandmother    • Colon cancer Paternal Grandmother    • Colon cancer Paternal Grandfather 70   • Heart disease Paternal Grandfather    • Hyperlipidemia Paternal Grandfather    • Ulcerative colitis Maternal Uncle    • Breast cancer Neg Hx    • Ovarian cancer Neg Hx    • Prostate cancer Neg Hx        Social History:   Social History     Socioeconomic History   • Marital status:    Tobacco Use   • Smoking status: Never Smoker   • Smokeless tobacco: Never Used   Vaping Use   • Vaping Use: Never used   Substance and Sexual Activity   • Alcohol use: Not Currently   • Drug use: Never   • Sexual activity: Yes     Partners: Male     Birth  "control/protection: OCP       Alcohol/Tobacco History:   Social History     Substance and Sexual Activity   Alcohol Use Not Currently     Social History     Tobacco Use   Smoking Status Never Smoker   Smokeless Tobacco Never Used       Medications:     Current Outpatient Medications:   •  hydrOXYzine (ATARAX) 10 MG tablet, Take 1 tablet by mouth 3 (Three) Times a Day As Needed for Anxiety., Disp: 90 tablet, Rfl: 1  •  hyoscyamine (LEVSIN) 0.125 MG SL tablet, Take 0.125 mg by mouth Every 4 (Four) Hours As Needed for Cramping., Disp: , Rfl:   •  levonorgestrel-ethinyl estradiol (AVIANE,ALESSE,LESSINA) 0.1-20 MG-MCG per tablet, Take 1 tablet by mouth Daily., Disp: 84 tablet, Rfl: 4  •  multivitamin (DAILY DOMENICO) tablet tablet, Take  by mouth Daily., Disp: , Rfl:   •  amitriptyline (ELAVIL) 10 MG tablet, Take 1 tablet by mouth Every Night., Disp: 30 tablet, Rfl: 5  •  SUMAtriptan (Imitrex) 20 MG/ACT nasal spray, 1 spray into the nostril(s) as directed by provider Every 2 (Two) Hours As Needed for Migraine., Disp: 1 each, Rfl: 2    Allergies:   Allergies   Allergen Reactions   • Bactrim [Sulfamethoxazole-Trimethoprim] Swelling     THROAT    • Nitrofurantoin Other (See Comments)     Abdominal pain   • Toradol [Ketorolac Tromethamine] Hives       Objective     Physical Exam:  Vital Signs:   Vitals:    05/03/22 1342   BP: 110/60   BP Location: Left arm   Patient Position: Sitting   Cuff Size: Adult   Pulse: 76   Temp: 97.6 °F (36.4 °C)   TempSrc: Temporal   SpO2: 99%   Weight: 56.2 kg (124 lb)   Height: 165.1 cm (65\")     Body mass index is 20.63 kg/m².     Physical Exam    Assessment / Plan      Assessment/Plan:   Diagnoses and all orders for this visit:    1. Irritable bowel syndrome with both constipation and diarrhea (Primary)  -     amitriptyline (ELAVIL) 10 MG tablet; Take 1 tablet by mouth Every Night.  Dispense: 30 tablet; Refill: 5    2. Left upper quadrant abdominal pain  -     Ambulatory referral for Screening " EGD  -     amitriptyline (ELAVIL) 10 MG tablet; Take 1 tablet by mouth Every Night.  Dispense: 30 tablet; Refill: 5    3. Intractable abdominal migraine  -     SUMAtriptan (Imitrex) 20 MG/ACT nasal spray; 1 spray into the nostril(s) as directed by provider Every 2 (Two) Hours As Needed for Migraine.  Dispense: 1 each; Refill: 2  -     amitriptyline (ELAVIL) 10 MG tablet; Take 1 tablet by mouth Every Night.  Dispense: 30 tablet; Refill: 5    4. Gastroesophageal reflux disease, unspecified whether esophagitis present  -     Ambulatory referral for Screening EGD  History H. pylori, does not want to restart omeprazole.  Continue Pepcid as needed, if gastritis on EGD, recommend return to PPI     Will start amitriptyline for IBS and chronic abdominal pain.  Recommend EGD.  I have reviewed her work-up done previously in [id number].  Symptoms and studies point to functional abdominal pain.  Is possible she is having abdominal migraines so she will use Imitrex at the onset of next pain episode and notify back to me if this is helpful.  Follow Up:   Return in about 6 weeks (around 6/14/2022).    Plan of care reviewed with the patient at the conclusion of today's visit.  Education was provided regarding diagnosis, management, and any prescribed or recommended OTC medications.  Patient verbalized understanding of and agreement with management plan.     Time Statement:   Discussed plan of care in detail with patient today. Patient verbally understands and agrees. I have spent 50 minutes reviewing available diagnostics, obtaining history, examining the patient, developing a treatment plan, and educating the patient on disease process and plan of care.    EDWARD Duong  [facility]

## 2022-06-13 ENCOUNTER — OUTSIDE FACILITY SERVICE (OUTPATIENT)
Dept: GASTROENTEROLOGY | Facility: CLINIC | Age: 22
End: 2022-06-13

## 2022-06-13 PROCEDURE — 88305 TISSUE EXAM BY PATHOLOGIST: CPT | Performed by: INTERNAL MEDICINE

## 2022-06-13 PROCEDURE — 43239 EGD BIOPSY SINGLE/MULTIPLE: CPT | Performed by: INTERNAL MEDICINE

## 2022-06-14 ENCOUNTER — LAB REQUISITION (OUTPATIENT)
Dept: LAB | Facility: HOSPITAL | Age: 22
End: 2022-06-14

## 2022-06-14 DIAGNOSIS — R10.12 LEFT UPPER QUADRANT PAIN: ICD-10-CM

## 2022-06-14 DIAGNOSIS — K31.89 OTHER DISEASES OF STOMACH AND DUODENUM: ICD-10-CM

## 2022-06-14 DIAGNOSIS — R10.9 UNSPECIFIED ABDOMINAL PAIN: ICD-10-CM

## 2022-06-14 DIAGNOSIS — R19.7 DIARRHEA, UNSPECIFIED: ICD-10-CM

## 2022-06-15 LAB
CYTO UR: NORMAL
LAB AP CASE REPORT: NORMAL
LAB AP CLINICAL INFORMATION: NORMAL
PATH REPORT.FINAL DX SPEC: NORMAL
PATH REPORT.GROSS SPEC: NORMAL

## 2022-06-27 ENCOUNTER — OFFICE VISIT (OUTPATIENT)
Dept: GASTROENTEROLOGY | Facility: CLINIC | Age: 22
End: 2022-06-27

## 2022-06-27 VITALS
HEIGHT: 65 IN | SYSTOLIC BLOOD PRESSURE: 100 MMHG | OXYGEN SATURATION: 98 % | TEMPERATURE: 97.5 F | HEART RATE: 86 BPM | DIASTOLIC BLOOD PRESSURE: 70 MMHG | BODY MASS INDEX: 20.69 KG/M2 | WEIGHT: 124.2 LBS

## 2022-06-27 DIAGNOSIS — K21.9 GASTROESOPHAGEAL REFLUX DISEASE, UNSPECIFIED WHETHER ESOPHAGITIS PRESENT: ICD-10-CM

## 2022-06-27 DIAGNOSIS — K58.2 IRRITABLE BOWEL SYNDROME WITH BOTH CONSTIPATION AND DIARRHEA: Primary | ICD-10-CM

## 2022-06-27 DIAGNOSIS — G43.D1 INTRACTABLE ABDOMINAL MIGRAINE: ICD-10-CM

## 2022-06-27 PROCEDURE — 99213 OFFICE O/P EST LOW 20 MIN: CPT | Performed by: NURSE PRACTITIONER

## 2022-06-27 RX ORDER — FAMOTIDINE 40 MG/1
40 TABLET, FILM COATED ORAL DAILY
Qty: 30 TABLET | Refills: 5 | Status: SHIPPED | OUTPATIENT
Start: 2022-06-27

## 2022-06-27 NOTE — PROGRESS NOTES
Follow Up      Patient Name: David Coyle  : 2000   MRN: [id number]     Chief Complaint:    Chief Complaint   Patient presents with   • Follow-up     EGD       History of Present Illness: David Coyle is a 22 y.o. female who is here today for follow up on abdominal pain.  Interval hx:    Last visit, David was started on amitriptyline for IBS.  She was also given Imitrex for abdominal migraines and an EGD was performed.  ENDOSCOPY, INT (2022)- gastritis     Has not been on pepcid- forgot to pick this up. She is still having heartburn-.  Only able to take amitriptyline 5 mg nightly due to drowsiness.  Has tried imitrex once- unsure if she noticed a difference.  She is unsure if she is better or not yet due to the unpredictability of her symptoms.  Definitely helping her headaches- has not had a migraine.      HPI:  Helen began having generalized abdominal pain associated with diarrhea several years ago.  Eventually, the 2 symptoms  and her abdominal pain became more localized.  She will now have left upper quadrant pain that can last several hours and be severe  (8 or 9 on the numeric pain scale) .     Tried Low FODMAP diet for 18 month but no change in pain.  Pain is intermittent and usually after eating.  There is early satiety. Levsin is helpful for the pain.There is nausea with the pain. Symptoms occur about 7-8 days a month. It does not correlate with her menstrual cycle. The pain does not occur in predictable cycles.  Currently she is having about 1 Bm a day - type 1-4 on the bristol scale.  Her weight fluctuates. There is no blood in the stool.    She does have frequent headaches- about 10 headache days a month with 1 day being a migraine.  In the past she has seen a migraine clinic and was started on amitriptyline but had to discontinue this due to fatigue.  There is occasional heartburn (about once a week). No dysphagia.  Was previously on PPIs but this  was very difficult to come off of so she wishes to avoid.  Taking Pepcid as needed.    Patient had a fairly thorough work-up at [facility] between 2016 and 2017.  Abdominal surgical history includes cholecystectomy  Had 2 EGD's and 1 colonoscopy and this was normal beside H pylori which was treated and documented as cured. (I do not have a copy of this to review)     CT ENTEROGRAPHY (12/22/2017 14:23)  US Non-ob Transvaginal (07/03/2017 10:21)  CT Abdomen Pelvis With Contrast (06/17/2017 09:25)  NM HEPATOBILIARY W GBEF (01/20/2016 15:28)  US RIGHT UPPER QUADRANT (01/05/2016 20:12)     Lab work-up negative (normal C3 and C4 complement, SANDY, C1 esterase inhibitor, TTG, CRP, TSH, CBC, CMP).     Colon cancer on both sides of her family in grandparents in their 60's.  Uncle has UC. Mother has lupus.    Subjective      Review of Systems:   Review of Systems   Constitutional: Negative for appetite change and unexpected weight loss.   HENT: Negative for trouble swallowing.    Gastrointestinal: Positive for abdominal pain, constipation, GERD and indigestion. Negative for abdominal distention, anal bleeding, blood in stool, diarrhea, nausea, rectal pain and vomiting.       Medications:     Current Outpatient Medications:   •  amitriptyline (ELAVIL) 10 MG tablet, Take 1 tablet by mouth Every Night., Disp: 30 tablet, Rfl: 5  •  hydrOXYzine (ATARAX) 10 MG tablet, Take 1 tablet by mouth 3 (Three) Times a Day As Needed for Anxiety., Disp: 90 tablet, Rfl: 1  •  hyoscyamine (LEVSIN) 0.125 MG SL tablet, Take 0.125 mg by mouth Every 4 (Four) Hours As Needed for Cramping., Disp: , Rfl:   •  levonorgestrel-ethinyl estradiol (AVIANE,ALESSE,LESSINA) 0.1-20 MG-MCG per tablet, Take 1 tablet by mouth Daily., Disp: 84 tablet, Rfl: 4  •  multivitamin (DAILY DOMENICO) tablet tablet, Take  by mouth Daily., Disp: , Rfl:   •  SUMAtriptan (Imitrex) 20 MG/ACT nasal spray, 1 spray into the nostril(s) as directed by provider Every 2 (Two)  "Hours As Needed for Migraine., Disp: 1 each, Rfl: 2  •  famotidine (Pepcid) 40 MG tablet, Take 1 tablet by mouth Daily., Disp: 30 tablet, Rfl: 5    Allergies:   Allergies   Allergen Reactions   • Bactrim [Sulfamethoxazole-Trimethoprim] Swelling     THROAT    • Toradol [Ketorolac Tromethamine] Hives       Social History:   Social History     Socioeconomic History   • Marital status:    Tobacco Use   • Smoking status: Never Smoker   • Smokeless tobacco: Never Used   Vaping Use   • Vaping Use: Never used   Substance and Sexual Activity   • Alcohol use: Not Currently   • Drug use: Never   • Sexual activity: Yes     Partners: Male     Birth control/protection: OCP        Surgical History:   Past Surgical History:   Procedure Laterality Date   • LAPAROSCOPIC CHOLECYSTECTOMY  02/2016   • TONSILLECTOMY  07/2020        Medical History:   Past Medical History:   Diagnosis Date   • Irritable bowel - diarrhea predominant 2015   • Migraine without status migrainosus, not intractable 2014        Objective     Physical Exam:  Vital Signs:   Vitals:    06/27/22 0814   BP: 100/70   BP Location: Left arm   Patient Position: Sitting   Cuff Size: Adult   Pulse: 86   Temp: 97.5 °F (36.4 °C)   TempSrc: Temporal   SpO2: 98%   Weight: 56.3 kg (124 lb 3.2 oz)   Height: 165.1 cm (65\")     Body mass index is 20.67 kg/m².     Physical Exam  Vitals and nursing note reviewed.   Constitutional:       General: She is not in acute distress.     Appearance: She is well-developed.   Cardiovascular:      Rate and Rhythm: Normal rate.   Pulmonary:      Effort: Pulmonary effort is normal. No accessory muscle usage or respiratory distress.   Abdominal:      General: Bowel sounds are normal.      Palpations: Abdomen is soft.   Musculoskeletal:      Right lower leg: No edema.      Left lower leg: No edema.   Skin:     Coloration: Skin is not pale.      Findings: No erythema.   Neurological:      Mental Status: She is alert and oriented to person, " place, and time.   Psychiatric:         Speech: Speech normal.         Behavior: Behavior normal.         Thought Content: Thought content normal.         Judgment: Judgment normal.         Assessment / Plan      Assessment/Plan:   Diagnoses and all orders for this visit:    1. Irritable bowel syndrome with both constipation and diarrhea (Primary)  Continue low-dose amitriptyline.  She is somewhat improved, however, her bowel symptoms are often unpredictable so she is unsure if this is related to the medication.  This has helped with her headaches however.  2. Intractable abdominal migraine  Recommend trialing Imitrex again  3. Gastroesophageal reflux disease, unspecified whether esophagitis present  -     famotidine (Pepcid) 40 MG tablet; Take 1 tablet by mouth Daily.  Dispense: 30 tablet; Refill: 5  Start Pepcid       Follow Up:   Return in about 3 months (around 9/27/2022).    Plan of care reviewed with the patient at the conclusion of today's visit.  Education was provided regarding diagnosis, management, and any prescribed or recommended OTC medications.  Patient verbalized understanding of and agreement with management plan.       EDWARD Duong  [facility]

## 2023-01-12 ENCOUNTER — HOSPITAL ENCOUNTER (OUTPATIENT)
Dept: GENERAL RADIOLOGY | Facility: HOSPITAL | Age: 23
Discharge: HOME | End: 2023-01-12
Payer: COMMERCIAL

## 2023-01-12 ENCOUNTER — LAB (OUTPATIENT)
Dept: LAB | Facility: HOSPITAL | Age: 23
End: 2023-01-12
Payer: COMMERCIAL

## 2023-01-12 ENCOUNTER — OFFICE VISIT (OUTPATIENT)
Dept: FAMILY MEDICINE CLINIC | Facility: CLINIC | Age: 23
End: 2023-01-12
Payer: COMMERCIAL

## 2023-01-12 VITALS
TEMPERATURE: 98.6 F | SYSTOLIC BLOOD PRESSURE: 110 MMHG | HEART RATE: 84 BPM | BODY MASS INDEX: 20.83 KG/M2 | HEIGHT: 65 IN | DIASTOLIC BLOOD PRESSURE: 70 MMHG | WEIGHT: 125 LBS | OXYGEN SATURATION: 98 %

## 2023-01-12 DIAGNOSIS — I73.00 RAYNAUD'S DISEASE WITHOUT GANGRENE: ICD-10-CM

## 2023-01-12 DIAGNOSIS — M25.551 RIGHT HIP PAIN: ICD-10-CM

## 2023-01-12 DIAGNOSIS — I73.00 RAYNAUD'S DISEASE WITHOUT GANGRENE: Primary | ICD-10-CM

## 2023-01-12 PROCEDURE — 36415 COLL VENOUS BLD VENIPUNCTURE: CPT

## 2023-01-12 PROCEDURE — 73502 X-RAY EXAM HIP UNI 2-3 VIEWS: CPT

## 2023-01-12 PROCEDURE — 86431 RHEUMATOID FACTOR QUANT: CPT

## 2023-01-12 PROCEDURE — 99214 OFFICE O/P EST MOD 30 MIN: CPT | Performed by: PHYSICIAN ASSISTANT

## 2023-01-12 NOTE — PROGRESS NOTES
Chief Complaint   Patient presents with   • Numbness     Bilateral Leg & Toe   • Leg Pain     Bilateral   • Hip Pain     right         David Coyle is a 22 y.o. female who presents for Numbness (Bilateral Leg & Toe), Leg Pain (Bilateral), and Hip Pain (right)     Patient reports focal deep right hip pain.  This extends to her groin and is worse with hip extension.  She has no pain into her leg.  No numbness.  Ongoing issue that has worsened recently.  Has not had imaging or physical therapy in the past.      She reports discoloration of her toes especially when it is cold outside, with certain activity or sitting/standing.  Pain worse with activity.  Told she has Raynauds and offered CCB.  Her blood pressure is naturally low so she declined this.  Nonsmoker.  SANDY negative, mother has lupus.    Past Medical History:   Diagnosis Date   • Irritable bowel - diarrhea predominant 2015   • Migraine without status migrainosus, not intractable 2014       Past Surgical History:   Procedure Laterality Date   • LAPAROSCOPIC CHOLECYSTECTOMY  02/2016   • TONSILLECTOMY  07/2020       Family History   Problem Relation Age of Onset   • Migraines Mother    • Depression Mother    • Irritable bowel syndrome Mother    • Arthritis Maternal Grandmother    • Hyperlipidemia Maternal Grandfather    • Diabetes Maternal Grandfather    • Arthritis Paternal Grandmother    • Colon cancer Paternal Grandmother    • Colon cancer Paternal Grandfather 70   • Heart disease Paternal Grandfather    • Hyperlipidemia Paternal Grandfather    • Ulcerative colitis Maternal Uncle    • Breast cancer Neg Hx    • Ovarian cancer Neg Hx    • Prostate cancer Neg Hx        Social History     Socioeconomic History   • Marital status:    Tobacco Use   • Smoking status: Never   • Smokeless tobacco: Never   Vaping Use   • Vaping Use: Never used   Substance and Sexual Activity   • Alcohol use: Not Currently   • Drug use: Never   • Sexual activity: Yes     " Partners: Male     Birth control/protection: OCP       Allergies   Allergen Reactions   • Bactrim [Sulfamethoxazole-Trimethoprim] Swelling     THROAT    • Toradol [Ketorolac Tromethamine] Hives       ROS    Review of Systems   Constitutional: Negative for chills, fatigue and fever.   Musculoskeletal: Positive for arthralgias.   Skin: Positive for color change.   Neurological: Positive for numbness. Negative for weakness.       Vitals:    01/12/23 1239   BP: 110/70   BP Location: Left arm   Patient Position: Sitting   Cuff Size: Adult   Pulse: 84   Temp: 98.6 °F (37 °C)   TempSrc: Temporal   SpO2: 98%   Weight: 56.7 kg (125 lb)   Height: 165.1 cm (65\")   PainSc: 0-No pain     Body mass index is 20.8 kg/m².    Current Outpatient Medications on File Prior to Visit   Medication Sig Dispense Refill   • famotidine (Pepcid) 40 MG tablet Take 1 tablet by mouth Daily. 30 tablet 5   • hydrOXYzine (ATARAX) 10 MG tablet Take 1 tablet by mouth 3 (Three) Times a Day As Needed for Anxiety. 90 tablet 1   • hyoscyamine (LEVSIN) 0.125 MG SL tablet Take 0.125 mg by mouth Every 4 (Four) Hours As Needed for Cramping.     • levonorgestrel-ethinyl estradiol (AVIANE,ALESSE,LESSINA) 0.1-20 MG-MCG per tablet Take 1 tablet by mouth Daily. 84 tablet 4   • multivitamin (DAILY DOMENICO) tablet tablet Take  by mouth Daily.     • SUMAtriptan (Imitrex) 20 MG/ACT nasal spray 1 spray into the nostril(s) as directed by provider Every 2 (Two) Hours As Needed for Migraine. 1 each 2   • [DISCONTINUED] amitriptyline (ELAVIL) 10 MG tablet Take 1 tablet by mouth Every Night. 30 tablet 5     No current facility-administered medications on file prior to visit.       Results for orders placed or performed in visit on 06/13/22   Tissue Pathology Exam    Specimen: 1: Small Intestine, Duodenum; Tissue    2: Stomach; Tissue   Result Value Ref Range    Case Report       Surgical Pathology Report                         Case: [id number]                                "   Authorizing Provider:  Karlo Mcclain MD    Collected:           06/13/2022 12:04 PM          Ordering Location:     [facility]   Received:            06/14/2022 09:44 AM                                 LABORATORY                                                                   Pathologist:           Evaristo Mosqueda MD                                                            Specimens:   1) - Small Intestine, Duodenum                                                                      2) - Stomach                                                                               Clinical Information       Left upper quadrant pain  Unspecified abdominal pain  Diarrhea, unspecified  Other diseases of stomach and duodenum      Final Diagnosis       1. DUODENUM, BIOPSIES:  Nonspecific mild chronic duodenitis.  Negative for significantly increased intraepithelial lymphocytes.  2.   STOMACH, BIOPSIES:  Gastric mucosa with reactive changes.  No Helicobacter pylori-like organisms seen.  Negative for dysplasia or malignancy.  [facility]      Gross Description       1. Small Intestine, Duodenum.  Received in formalin labeled duodenum are 2 tan soft tissue fragments aggregating 0.4 x 0.4 x 0.2 cm submitted entirely in a single cassette.      2. Stomach.  Received in formalin labeled stomach are 2 tan soft tissue fragments aggregating 0.5 x 0.4 x 0.2 cm submitted entirely in a single cassette.  [facility]          Microscopic Description       The slides are reviewed and demonstrate histopathologic features supporting the above rendered diagnosis.           PE    Physical Exam  Vitals reviewed.   Constitutional:       General: She is not in acute distress.     Appearance: Normal appearance. She is well-developed and normal weight. She is not ill-appearing or diaphoretic.   HENT:      Head: Normocephalic and atraumatic.   Eyes:      Extraocular Movements: Extraocular movements intact.      Conjunctiva/sclera: Conjunctivae  normal.   Pulmonary:      Effort: No respiratory distress.   Musculoskeletal:         General: Normal range of motion.      Cervical back: Normal range of motion.        Legs:    Skin:     Coloration: Skin is cyanotic.   Neurological:      General: No focal deficit present.      Mental Status: She is alert.   Psychiatric:         Attention and Perception: She is attentive.         Mood and Affect: Mood normal.         Speech: Speech normal.         Behavior: Behavior normal. Behavior is cooperative.         Thought Content: Thought content normal.         Judgment: Judgment normal.          A/P    Diagnoses and all orders for this visit:    1. Raynaud's disease without gangrene (Primary)  -     Rheumatoid factor; Future  SANDY negative, mother has lupus.  Nonsmoker.  Handout on Raynaud's given to patient.  Discussed wearing appropriate clothing and shoes when it is cold.  Could consider CCB but her blood pressure is low and we would have to monitor for hypotension.    2. Right hip pain  -     Rheumatoid factor; Future  -     Ambulatory Referral to Physical Therapy Evaluate and treat  -     XR Hip With or Without Pelvis 2 - 3 View Right; Future  Check imaging and RF.  Start physical therapy.  Return in 1 month or call sooner if symptoms worsen or change.       Plan of care reviewed with patient at the conclusion of today's visit. Education was provided regarding diagnosis, management and any prescribed or recommended OTC medications.  Patient verbalizes understanding of and agreement with management plan.    Dictated Utilizing Dragon Dictation     Please note that portions of this note were completed with a voice recognition program.     Part of this note may be an electronic transcription/translation of spoken language to printed text using the Dragon Dictation System.    Return in about 6 weeks (around 2/23/2023) for Recheck, right hip pain.     Rosalba Palacios PA-C    Answers for HPI/ROS submitted by the  patient on 1/5/2023  Please describe your symptoms.: Sore hip and inner ankles with poor circulation to my toes (always cold, turn purple/red)  Have you had these symptoms before?: Yes  How long have you been having these symptoms?: Greater than 2 weeks  What is the primary reason for your visit?: Other

## 2023-01-13 LAB — CHROMATIN AB SERPL-ACNC: <10 IU/ML (ref 0–14)

## 2023-02-02 ENCOUNTER — TREATMENT (OUTPATIENT)
Dept: PHYSICAL THERAPY | Facility: CLINIC | Age: 23
End: 2023-02-02
Payer: COMMERCIAL

## 2023-02-02 DIAGNOSIS — M25.559 PAIN, HIP: Primary | ICD-10-CM

## 2023-02-02 DIAGNOSIS — R53.1 WEAKNESS: ICD-10-CM

## 2023-02-02 PROCEDURE — 97161 PT EVAL LOW COMPLEX 20 MIN: CPT | Performed by: PHYSICAL THERAPIST

## 2023-02-02 PROCEDURE — 97110 THERAPEUTIC EXERCISES: CPT | Performed by: PHYSICAL THERAPIST

## 2023-02-02 NOTE — PROGRESS NOTES
Physical Therapy Initial Evaluation and Plan of Care  [facility]- [place]  [address]  [place] [address]    Patient: David Coyle   : 2000  Diagnosis/ICD-10 Code:  No primary diagnosis found.  Referring practitioner: Rosalba Palacios, *  Date of Initial Visit: 2023  Today's Date: 2023  Patient seen for Visit count could not be calculated. Make sure you are using a visit which is associated with an episode. session         Visit Diagnoses:  No diagnosis found.      Subjective Questionnaire: LEFS: 77      Subjective Evaluation    History of Present Illness  Mechanism of injury: The pt reported a 3 year history of R lateral hip pain that began with no apparent CLEO. 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 Symptoms have worsened since that time and are now more consistent and more readily provoked.    Pain is described as deep and is worsened with crossing her legs, prolonged walking, direct pressure to the R hip, biking, running, and with leg presses. Pain is improved with NSAIDs and she gets transient relief appx 75% of the time. She gets popping and clicking with large hip movements but stated they are generally not painful.     She plays volleyball at [employer] and has not been able to play in the last few months, partially due to pain.       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     Right    No palpable tenderness to the piriformis. Tenderness of the gluteus heriberto, gluteus medius and TFL.     Tenderness     Left Hip   No tenderness in the greater trochanter, ischial tuberosity and sacroiliac joint.     Right Hip   Tenderness in the greater trochanter. No tenderness in the ischial tuberosity and sacroiliac joint.     Neurological Testing     Sensation     Hip   Left Hip   Intact: light touch    Right Hip   Intact: light touch    Passive Range of Motion   Left Hip   Flexion: 150 degrees   Extension: 50 degrees   External rotation (90/90): 85 degrees   Internal rotation (90/90): 85 degrees     Right Hip   Flexion: 150 degrees   Extension: 45 degrees   External rotation (90/90): 80 degrees   Internal rotation (90/90): 85 degrees     Strength/Myotome Testing     Left Hip   Planes of Motion   Flexion: 5  Extension: 4  Abduction: 4-  External rotation: 4+    Right Hip   Planes of Motion   Flexion: 5  Extension: 4-  Abduction: 4-  External rotation: 4    Tests     Right Hip   Positive FADIR.   Negative CHICO.     Additional Tests Details  +posterior shear in R hip    Left Hip Flexibility Comments:   90/90 hamstring test - full extension of knee    Right Hip Flexibility Comments:   90/90 hamstring test - full extension of knee          Assessment & Plan     Assessment  Impairments: abnormal gait, abnormal muscle firing, abnormal or restricted ROM, activity intolerance, impaired balance, impaired physical strength, lacks appropriate home exercise program and pain with function  Functional Limitations: lifting, walking, uncomfortable because of pain, standing, stooping and unable to perform repetitive tasks  Assessment details: The patient is a 23 yo female who presented to PT with evolving characteristics of chronic R hip pain with low complexity. Signs and symptoms are consistent with a labral injury. She displayed hypermobility in all planes of the hips but had limited muscular support, particularly into  abduction and extension. Symptoms were reproduced with FADIR testing and posterior shearing and again functionally with SLS activities. She was prescribed an HEP for glute strengthening and was encouraged to discontinue aggressive stretching. I expect the patient to make a timely recovery with skilled PT intervention.     Prognosis: good    Goals  Plan Goals: Short Term Goals (4 weeks):     1. The patient will be independent and compliant with initial HEP.     2. The patient will report pain at rest 0/10 or less and worst pain 4/10 or less.    3. The patient will display decreased TTP in the glute tendons and dec mm tension in the surrounding musculature.    4. The patient will demonstrate inc strength evidenced by MMT as follows: hip abd 4/5, hip ext 4+/5, hip ER 4+/5, and hip IR 5/5.    5. LEFS will improve to 80.           Long Term Goals (8 weeks):     1. The patient will be appropriate for independent management and compliant with progressed HEP.     2. The patient will report pain at rest 0/10 or less and worst pain 3/10 or less.    3. The patient will return to work duties and/or ADLs with no limitations due to hip pain or dysfunction.    4. The patient will return to recreational and community activities with no limitations due to hip pain or dysfunction.      Plan  Therapy options: will be seen for skilled therapy services  Planned modality interventions: cryotherapy, electrical stimulation/Russian stimulation, iontophoresis, TENS and thermotherapy (hydrocollator packs)  Planned therapy interventions: ADL retraining, body mechanics training, balance/weight-bearing training, flexibility, functional ROM exercises, gait training, home exercise program, joint mobilization, manual therapy, neuromuscular re-education, postural training, soft tissue mobilization, strengthening, stretching, therapeutic activities and transfer training  Frequency: 1x week  Duration in visits: 8  Duration in weeks: 8  Treatment plan  discussed with: patient  Plan details: The pt will likely benefit from TE/TA/NMED to improve strength of the hips, quads, and hamstrings, to improve balance, and to restore normal proprioception. MT will be utilized to improve ROM and jt mobility. Modalities will be used as needed for pain modulation. Dry needling as indicated.         History # of Personal Factors and/or Comorbidities: LOW (0)  Examination of Body System(s): # of elements: LOW (1-2)  Clinical Presentation: EVOLVING  Clinical Decision Making: LOW       Timed:         Manual Therapy:    0     mins  01708;     Therapeutic Exercise:    15     mins  22653;     Neuromuscular Mary:    0    mins  45870;    Therapeutic Activity:     0     mins  79195;     Gait Trainin     mins  24874;     Ultrasound:     0     mins  43416;    Ionto                               0    mins   48904  Self Care                       0     mins   46672  Canalith Repos    0     mins 39161      Un-Timed:  Electrical Stimulation:    0     mins  63863 ( );  Dry Needling     0     mins self-pay  Traction     0     mins 58170  Low Eval     25     Mins  64895  Mod Eval     0     Mins  65782  High Eval                       0     Mins  22324        Timed Treatment:   15   mins   Total Treatment:     40   mins          PT: Ronni Demarco PT     License Number: [id number]  Electronically signed by Ronni Demarco PT, 23, 10:32 AM EST    Certification Period: 2023 thru 2023  I certify that the therapy services are furnished while this patient is under my care.  The services outlined above are required by this patient, and will be reviewed every 90 days.         Physician Signature:__________________________________________________    PHYSICIAN: Rosalba Palacios PA-C  NPI: [id number]                                      DATE:      Please sign and return via fax to [email or web address] . Thank you, [facility].

## 2023-02-09 ENCOUNTER — TREATMENT (OUTPATIENT)
Dept: PHYSICAL THERAPY | Facility: CLINIC | Age: 23
End: 2023-02-09
Payer: COMMERCIAL

## 2023-02-09 DIAGNOSIS — M25.559 PAIN, HIP: Primary | ICD-10-CM

## 2023-02-09 DIAGNOSIS — R53.1 WEAKNESS: ICD-10-CM

## 2023-02-09 PROCEDURE — 97140 MANUAL THERAPY 1/> REGIONS: CPT | Performed by: PHYSICAL THERAPIST

## 2023-02-09 PROCEDURE — 97110 THERAPEUTIC EXERCISES: CPT | Performed by: PHYSICAL THERAPIST

## 2023-02-09 NOTE — PROGRESS NOTES
Physical Therapy Daily Treatment Note  [facility]- [place]  [address], [address]  [place] [address]      Patient: David Coyle   : 2000  Referring practitioner: Rosalba Palacios, *  Date of Initial Visit: Type: THERAPY  Noted: 2023  Today's Date: 2023  Patient seen for 2 sessions       Visit Diagnoses:    ICD-10-CM ICD-9-CM   1. Pain, hip  M25.559 719.45   2. Weakness  R53.1 780.79       Subjective Evaluation    History of Present Illness  Mechanism of injury: The pt stated that her hip was bothering her upon presentation due to prolonged standing and walking at rounds today. She was initially very sore following HEP performance but this has improved. She has had her  perform hip distraction this week and has gotten good temporary relief from this.     Pain  Current pain ratin  Location: R hip           Objective   See Exercise, Manual, and Modality Logs for complete treatment.       Assessment & Plan     Assessment    Assessment details: The pt responded well to hip distraction last visit so this was continued in several planes today. Glute weakness remains a limitation to stability but she tolerated glute med strengthening much better today. As a result, her HEP was progressed. Anterior hip stabilization and core strengthening was also introduced and added to an HEP. She should see long term improvement from consistent strengthening and mobs as needed for pain relief.    Plan  Plan details: Continue progressions of glute and core strengthening as tolerated.          Timed:         Manual Therapy:    10     mins  86417;     Therapeutic Exercise:    30     mins  72057;     Neuromuscular Mary:    0    mins  22486;    Therapeutic Activity:     0     mins  43171;     Gait Trainin     mins  20011;     Ultrasound:     0     mins  23194;    Ionto                               0    mins   76215  Self Care                       0     mins   11210  Jaret  Repos    0     mins 95468      Un-Timed:  Electrical Stimulation:    0     mins  34037 ( );  Dry Needling     0     mins self-pay  Traction     0     mins 21615      Timed Treatment:   40   mins   Total Treatment:     40   mins    Ronni Demarco, PT  [place] License: [id number]

## 2023-02-16 ENCOUNTER — TREATMENT (OUTPATIENT)
Dept: PHYSICAL THERAPY | Facility: CLINIC | Age: 23
End: 2023-02-16
Payer: COMMERCIAL

## 2023-02-16 DIAGNOSIS — M25.559 PAIN, HIP: Primary | ICD-10-CM

## 2023-02-16 DIAGNOSIS — R53.1 WEAKNESS: ICD-10-CM

## 2023-02-16 PROCEDURE — 97110 THERAPEUTIC EXERCISES: CPT | Performed by: PHYSICAL THERAPIST

## 2023-02-16 PROCEDURE — 97140 MANUAL THERAPY 1/> REGIONS: CPT | Performed by: PHYSICAL THERAPIST

## 2023-02-16 NOTE — PROGRESS NOTES
Physical Therapy Daily Treatment Note  [facility]- [place]  [address], [address]  [place] [address]      Patient: David Coyle   : 2000  Referring practitioner: Rosalba Palacios, *  Date of Initial Visit: Type: THERAPY  Noted: 2023  Today's Date: 2023  Patient seen for 3 sessions       Visit Diagnoses:    ICD-10-CM ICD-9-CM   1. Pain, hip  M25.559 719.45   2. Weakness  R53.1 780.79       Subjective Evaluation    History of Present Illness  Mechanism of injury: The pt stated that her hip has remained achy from exercise but she feels she is tolerating her current HEP well. She continues to have her  perform a manual distraction to the hip and feels she gets some relief. Pain overall is about the same.     Pain  Current pain rating: 3  Location: R hip           Objective   See Exercise, Manual, and Modality Logs for complete treatment.       Assessment & Plan     Assessment    Assessment details: R hip pain has been relatively unchanged in the initial couple of weeks of PT. Strengthening of the glutes and core is going well and will require several weeks of consistency to make a significant difference. She responded well to traction today, particularly in prone, and was encouraged to have her  assist with this at home. She may benefit from hip flexor release techniques or dry needling in future visits.    Plan  Plan details: Consider anterior hip IDN or hip flexor release. Continue glute and core strengthening.           Timed:         Manual Therapy:    15     mins  60300;     Therapeutic Exercise:    40     mins  08922;     Neuromuscular Mary:    0    mins  11008;    Therapeutic Activity:     0     mins  12666;     Gait Trainin     mins  87236;     Ultrasound:     0     mins  76704;    Ionto                               0    mins   19962  Self Care                       0     mins   75326  Canalith Repos    0     mins  10726      Un-Timed:  Electrical Stimulation:    0     mins  23531 ( );  Dry Needling     0     mins self-pay  Traction     0     mins 75647      Timed Treatment:   55   mins   Total Treatment:     55   mins    Ronni Demarco, PT  [place] License: [id number]

## 2023-03-10 ENCOUNTER — TREATMENT (OUTPATIENT)
Dept: PHYSICAL THERAPY | Facility: CLINIC | Age: 23
End: 2023-03-10
Payer: COMMERCIAL

## 2023-03-10 DIAGNOSIS — M25.559 PAIN, HIP: Primary | ICD-10-CM

## 2023-03-10 DIAGNOSIS — R53.1 WEAKNESS: ICD-10-CM

## 2023-03-10 PROCEDURE — 97110 THERAPEUTIC EXERCISES: CPT | Performed by: PHYSICAL THERAPIST

## 2023-03-10 NOTE — PROGRESS NOTES
Physical Therapy Re Certification Of Plan of Care  [facility] [place]  [facility] [address] [address]    Patient: David Coyle   : 2000  Diagnosis/ICD-10 Code:  Pain, hip [M25.559]  Referring practitioner: Rosalba Palacios, *  Date of Initial Visit: Type: THERAPY  Noted: 2023  Today's Date: 3/10/2023  Patient seen for 4 sessions         Visit Diagnoses:    ICD-10-CM ICD-9-CM   1. Pain, hip  M25.559 719.45   2. Weakness  R53.1 780.79       Subjective Questionnaire: LEFS: 79  Clinical Progress: improved  Home Program Compliance: Yes  Treatment has included: therapeutic exercise, neuromuscular re-education, manual therapy and therapeutic activity      Subjective Evaluation    History of Present Illness  Mechanism of injury: The pt stated that her R hip has been feeling much better in the last few weeks. She feels about 80% better. She feels that her hip abduction exercise has gotten easier and she is now doing 30 at a time. She continues to experience mild pain after prolonged standing at work but is able to reduce this with LAD and rest. She feels confident managing independently moving forward.     Pain  Current pain ratin  At worst pain ratin  Location: R hip             Objective          Palpation   Left   No palpable tenderness to the gluteus heriberto, gluteus medius, piriformis and TFL.     Right   No palpable tenderness to the piriformis. Tenderness of the gluteus heriberto, gluteus medius and TFL.     Tenderness     Left Hip   No tenderness in the greater trochanter, ischial tuberosity and sacroiliac joint.     Right Hip   Tenderness in the greater trochanter. No tenderness in the ischial tuberosity and sacroiliac joint.     Neurological Testing     Sensation     Hip   Left Hip   Intact: light touch    Right Hip   Intact: light touch    Passive Range of Motion   Left Hip   Flexion: 150 degrees   Extension: 50 degrees   External rotation (90/90): 85  degrees   Internal rotation (90/90): 85 degrees     Right Hip   Flexion: 150 degrees   Extension: 45 degrees   External rotation (90/90): 80 degrees   Internal rotation (90/90): 85 degrees     Strength/Myotome Testing     Left Hip   Planes of Motion   Flexion: 5  Extension: 5  Abduction: 4+  External rotation: 5    Right Hip   Planes of Motion   Flexion: 5  Extension: 5  Abduction: 4+  External rotation: 5    Tests     Right Hip   Positive FADIR.   Negative CHICO.     Additional Tests Details  +posterior shear in R hip    Left Hip Flexibility Comments:   90/90 hamstring test - full extension of knee    Right Hip Flexibility Comments:   90/90 hamstring test - full extension of knee          Assessment & Plan     Assessment    Assessment details: The pt responded well to PT interventions for R hip pain and saw improved tolerance to standing, reduced pain, and increased strength in the glutes in the initial few weeks of care. She has ongoing signs and symptoms of a labral tear, but has seen reduced discomfort as her hip strength has improved, and I expect ongoing improvement as she continues independent strengthening. The glute med remains the area of deficiency and will need isolated exercise to reduce stress on the mechanical hip structures. She demonstrated better form with hip abduction exercises today compared to previous visits, but was easily fatigued. This resulted in valgus knee collapse with lunges and lead to pain at the hip during the treatment sessions. She was prescribed additional glute med strengthening activities and was encouraged to purchase a foam roller for STM. She is motivated to rehab and should do well with independent management.     Goals  Plan Goals: Short Term Goals (4 weeks):     1. The patient will be independent and compliant with initial HEP. Met    2. The patient will report pain at rest 0/10 or less and worst pain 4/10 or less. Met    3. The patient will display decreased TTP in the  glute tendons and dec mm tension in the surrounding musculature. Met    4. The patient will demonstrate inc strength evidenced by MMT as follows: hip abd 4/5, hip ext 4+/5, hip ER 4+/5, and hip IR 5/5. Met     5. LEFS will improve to 80. Not met          Long Term Goals (8 weeks):     1. The patient will be appropriate for independent management and compliant with progressed HEP. Met     2. The patient will report pain at rest 0/10 or less and worst pain 3/10 or less. Met    3. The patient will return to work duties and/or ADLs with no limitations due to hip pain or dysfunction. Met    4. The patient will return to recreational and community activities with no limitations due to hip pain or dysfunction. Partially met        Plan  Plan details: Pt to attempt independent management and call for f/u as needed.           Recommendations: Discharge        Timed:         Manual Therapy:    0     mins  61728;     Therapeutic Exercise:    45     mins  51921;     Neuromuscular Mary:    0    mins  27115;    Therapeutic Activity:     0     mins  23848;     Gait Trainin     mins  21956;     Ultrasound:     0     mins  22569;    Ionto                               0    mins   83721  Self Care                       0     mins   07136  Canalith Repos    0     mins 14278      Un-Timed:  Electrical Stimulation:    0     mins  93571 ( );  Dry Needling     0     mins self-pay  Traction     0     mins 28341  Re-Eval                           0    mins  70632      Timed Treatment:   45   mins   Total Treatment:     45   mins          PT: Ronni Demarco PT     [place] License:  [id number]    Electronically signed by Ronni Demarco PT, 03/10/23, 2:33 PM EST    Certification Period: 3/10/2023 thru 2023  I certify that the therapy services are furnished while this patient is under my care.  The services outlined above are required by this patient, and will be reviewed every 90 days.         Physician  Signature:__________________________________________________    PHYSICIAN: Rosalba Palacios PA-C  NPI: [id number]                                      DATE:  :     Please sign and return via fax to [email or web address] . Thank you, [facility]       Discharge Summary  Discharge Summary from Physical Therapy Report      Date of initial PT visit: 2/2/23    Number of Visits: 4     Goals: All Met    Discharge Plan: Continue with current home exercise program as instructed    Comments: see assessment    Date of Discharge: 3/10/23        Ronni Demarco PT  Physical Therapist

## 2023-03-23 ENCOUNTER — OFFICE VISIT (OUTPATIENT)
Dept: OBSTETRICS AND GYNECOLOGY | Facility: CLINIC | Age: 23
End: 2023-03-23
Payer: COMMERCIAL

## 2023-03-23 VITALS
DIASTOLIC BLOOD PRESSURE: 76 MMHG | WEIGHT: 125 LBS | SYSTOLIC BLOOD PRESSURE: 118 MMHG | RESPIRATION RATE: 14 BRPM | BODY MASS INDEX: 20.8 KG/M2

## 2023-03-23 DIAGNOSIS — Z71.85 VACCINE COUNSELING: ICD-10-CM

## 2023-03-23 DIAGNOSIS — Z01.419 WELL WOMAN EXAM: Primary | ICD-10-CM

## 2023-03-23 PROCEDURE — 99395 PREV VISIT EST AGE 18-39: CPT | Performed by: OBSTETRICS & GYNECOLOGY

## 2023-03-23 RX ORDER — LEVONORGESTREL AND ETHINYL ESTRADIOL 0.1-0.02MG
1 KIT ORAL DAILY
Qty: 84 TABLET | Refills: 4 | Status: SHIPPED | OUTPATIENT
Start: 2023-03-23

## 2023-03-23 NOTE — PROGRESS NOTES
Subjective   Chief Complaint   Patient presents with   • Gynecologic Exam     David Coyle is a 22 y.o. year old  presenting to be seen for her annual exam.    SEXUAL Hx:  She is currently sexually active.  In the past year there there has been NO new sexual partners.    Condoms are never used.  She would not like to be screened for STD's at today's exam.  Current birth control method: OCP (estrogen/progesterone).  She is happy with her current method of contraception and does not want to discuss alternative methods of contraception.  MENSTRUAL Hx:  Patient's last menstrual period was 2023.  In the past 6 months her cycles have been regular, predictable and occur monthly.  Her menstrual flow is typically normal.   Each month on average there are roughly 0 day(s) of very heavy flow.  Intermenstrual bleeding is absent.    Post-coital bleeding is absent.  Dysmenorrhea: is not affecting her activities of daily living  PMS: is not affecting her activities of daily living  Her cycles are not a source of concern for her that she wishes to discuss today.  HEALTH Hx:  She exercises regularly: yes.  She wears her seat belt: yes.  She has concerns about domestic violence: no.  OTHER THINGS SHE WANTS TO DISCUSS TODAY:  Nothing else    The following portions of the patient's history were reviewed and updated as appropriate:problem list, current medications, allergies, past family history, past medical history, past social history and past surgical history.    Social History    Tobacco Use      Smoking status: Never      Smokeless tobacco: Never    Review of Systems  Constitutional POS: nothing reported    NEG: anorexia or night sweats   Genitourinary POS: nothing reported    NEG: dysuria or hematuria      Gastointestinal POS: nothing reported    NEG: bloating, change in bowel habits, melena or reflux symptoms   Integument POS: nothing reported    NEG: moles that are changing in size, shape, color or  rashes   Breast POS: nothing reported    NEG: persistent breast lump, skin dimpling or nipple discharge        Objective   /76   Resp 14   Wt 56.7 kg (125 lb)   LMP 02/28/2023   BMI 20.80 kg/m²     General:  well developed; well nourished  no acute distress   Skin:  No suspicious lesions seen   Thyroid: normal to inspection and palpation   Breasts:  Examined in supine position  Symmetric without masses or skin dimpling  Nipples normal without inversion, lesions or discharge  There are no palpable axillary nodes   Abdomen: soft, non-tender; no masses  no umbilical or inguinal hernias are present  no hepato-splenomegaly   Pelvis: Clinical staff was present for exam  External genitalia:  normal appearance of the external genitalia including Bartholin's and [place]'s glands.  :  urethral meatus normal;  Vaginal:  normal pink mucosa without prolapse or lesions.  Cervix:  normal appearance.  Uterus:  normal size, shape and consistency.  Adnexa:  normal bimanual exam of the adnexa.  Rectal:  digital rectal exam not performed; anus visually normal appearing.        Assessment   1. Normal GYN exam  2. She is up to date on all relevant gynecologic and colorectal screenings     Plan   1. Pap was done today.  If she does not receive the results of the Pap within 2 weeks  time, she was instructed to call to find out the results.  I explained to David that the recommendations for Pap smear interval in a low risk patient's has lengthened to 3 years time.  I encouraged her to be seen yearly for a full physical exam including breast and pelvic exam even during the off years when PAP's will not be performed.  2. Her vaccine record was reviewed and updated.  3. The importance of keeping all planned follow-up and taking all medications as prescribed was emphasized.  4. Follow up for annual exam 1 year    New Medications Ordered This Visit   Medications   • levonorgestrel-ethinyl estradiol (AVIANE,ALESSE,LESSINA) 0.1-20  MG-MCG per tablet     Sig: Take 1 tablet by mouth Daily.     Dispense:  84 tablet     Refill:  4          This note was electronically signed.    Noman Aguilar M.D.  March 23, 2023    Part of this note may be an electronic transcription/translation of spoken language to printed text using the Dragon Dictation System.

## 2023-03-24 LAB — REF LAB TEST METHOD: NORMAL

## 2023-09-18 ENCOUNTER — OFFICE VISIT (OUTPATIENT)
Dept: FAMILY MEDICINE CLINIC | Facility: CLINIC | Age: 23
End: 2023-09-18
Payer: COMMERCIAL

## 2023-09-18 ENCOUNTER — HOSPITAL ENCOUNTER (OUTPATIENT)
Dept: GENERAL RADIOLOGY | Facility: HOSPITAL | Age: 23
Discharge: HOME | End: 2023-09-18
Admitting: PHYSICIAN ASSISTANT
Payer: COMMERCIAL

## 2023-09-18 VITALS
TEMPERATURE: 98.2 F | HEIGHT: 65 IN | OXYGEN SATURATION: 98 % | DIASTOLIC BLOOD PRESSURE: 60 MMHG | WEIGHT: 122 LBS | SYSTOLIC BLOOD PRESSURE: 96 MMHG | BODY MASS INDEX: 20.33 KG/M2 | HEART RATE: 68 BPM

## 2023-09-18 DIAGNOSIS — M25.511 ACUTE PAIN OF RIGHT SHOULDER: ICD-10-CM

## 2023-09-18 DIAGNOSIS — K58.0 IRRITABLE BOWEL SYNDROME WITH DIARRHEA: ICD-10-CM

## 2023-09-18 DIAGNOSIS — B07.0 PLANTAR WART OF LEFT FOOT: Primary | ICD-10-CM

## 2023-09-18 PROCEDURE — 99214 OFFICE O/P EST MOD 30 MIN: CPT | Performed by: PHYSICIAN ASSISTANT

## 2023-09-18 PROCEDURE — 73030 X-RAY EXAM OF SHOULDER: CPT

## 2023-09-18 RX ORDER — METHYLPREDNISOLONE 4 MG/1
TABLET ORAL
Qty: 1 EACH | Refills: 0 | Status: SHIPPED | OUTPATIENT
Start: 2023-09-18

## 2023-09-18 NOTE — PROGRESS NOTES
Chief Complaint   Patient presents with    Plantar Warts     Left foot    Shoulder Pain     Right shoulder         David Coyle is a 23 y.o. female who presents for Plantar Warts (Left foot) and Shoulder Pain (Right shoulder)    Patient reports acute onset of right shoulder pain that started a week ago.  No known trauma/injury.  Pain is worse when arm is dangling by her side.  She has full range of motion.  History of playing volleyball.  She is right handed.  Has had pain in shoulder in the past but never this bad.  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    Also reports plantar wart on left foot that is not getting better with conservative treatment.      Past Medical History:   Diagnosis Date    COVID 03/2023    Irritable bowel - diarrhea predominant 2015    Migraine without status migrainosus, not intractable 2014       Past Surgical History:   Procedure Laterality Date    LAPAROSCOPIC CHOLECYSTECTOMY  02/2016    TONSILLECTOMY  07/2020       Family History   Problem Relation Age of Onset    Migraines Mother     Depression Mother     Irritable bowel syndrome Mother     Arthritis Maternal Grandmother     Hyperlipidemia Maternal Grandfather     Diabetes Maternal Grandfather     Arthritis Paternal Grandmother     Colon cancer Paternal Grandmother     Colon cancer Paternal Grandfather 70    Heart disease Paternal Grandfather     Hyperlipidemia Paternal Grandfather     Ulcerative colitis Maternal Uncle     Breast cancer Neg Hx     Ovarian cancer Neg Hx     Prostate cancer Neg Hx        Social History     Socioeconomic History    Marital status:    Tobacco Use    Smoking status: Never    Smokeless tobacco: Never   Vaping Use    Vaping Use: Never used   Substance and Sexual Activity    Alcohol use: Not Currently    Drug use: Never    Sexual activity: Yes     Partners: Male     Birth control/protection: OCP       Allergies   Allergen Reactions    Bactrim  "[Sulfamethoxazole-Trimethoprim] Swelling     THROAT     Toradol [Ketorolac Tromethamine] Hives       ROS    Review of Systems   Constitutional:  Negative for chills and fever.   Musculoskeletal:  Positive for arthralgias and myalgias.   Skin:  Negative for color change and rash.        wart     Vitals:    23 0854   BP: 96/60   Pulse: 68   Temp: 98.2 °F (36.8 °C)   TempSrc: Infrared   SpO2: 98%   Weight: 55.3 kg (122 lb)   Height: 165.1 cm (65\")     Body mass index is 20.3 kg/m².    Current Outpatient Medications on File Prior to Visit   Medication Sig Dispense Refill    famotidine (Pepcid) 40 MG tablet Take 1 tablet by mouth Daily. 30 tablet 5    hydrOXYzine (ATARAX) 10 MG tablet Take 1 tablet by mouth 3 (Three) Times a Day As Needed for Anxiety. 90 tablet 1    levonorgestrel-ethinyl estradiol (AVIANE,ALESSE,LESSINA) 0.1-20 MG-MCG per tablet Take 1 tablet by mouth Daily. 84 tablet 4    multivitamin (DAILY DOMENICO) tablet tablet Take  by mouth Daily.      SUMAtriptan (Imitrex) 20 MG/ACT nasal spray 1 spray into the nostril(s) as directed by provider Every 2 (Two) Hours As Needed for Migraine. 1 each 2    [DISCONTINUED] hyoscyamine (LEVSIN) 0.125 MG SL tablet Take 1 tablet by mouth Every 4 (Four) Hours As Needed for Cramping.       No current facility-administered medications on file prior to visit.       Results for orders placed or performed in visit on 23   LIQUID-BASED PAP SMEAR, [facility] ([facility])    Specimen: ThinPrep Vial   Result Value Ref Range    Reference Lab Report       [facility]  [address] [place] [address]  Phone: [phone] or [phone]  Fax: [phone]  Antwan Huggins M.D., Medical Director    PATIENT NAME                                     LABORATORY NO.  [facility].                        [id number]  [id number]                                 AGE                    SEX   SSN              CLIENT REF #  YAZMIN " MD CINDI                       24 2000      F     [id number]      [id number]    YAZMIN PUENTE MD                    REQUESTING M.DOMENICA.           ATTENDING M.D.         [facility] TO.  [address] [facility] [address]              [place] [address]                        DATE COLLECTED            DATE RECEIVED          DATE REPORTED  03/23/2023 03/23/2023 03/24/2023    ThinPrep Pap with [facility]    DIAGNOSIS:  Negative for intraepithelial lesion or malignancy    Multiple factors can influence accuracy of  Pap tests; therefore, screening at  regular intervals is necessary for early cancer detection.      SPECIMEN ADEQUACY:  SATISFACTORY FOR EVALUATION  Partially obscuring inflammation is present.  Sparse cellularity, minimal number of squamous cells available for evaluation.  SOURCE OF SPECIMEN:       CERVICAL  SLIDES:  1  CLINICAL HISTORY:  Well woman exam      CYTOTECHNOLOGIST:             JIM MALDONADO (ASCP)    CPT CODES:  16437         PE    Physical Exam  Vitals reviewed.   Constitutional:       General: She is not in acute distress.     Appearance: Normal appearance. She is well-developed. She is not ill-appearing or diaphoretic.   HENT:      Head: Normocephalic and atraumatic.   Eyes:      Extraocular Movements: Extraocular movements intact.      Conjunctiva/sclera: Conjunctivae normal.   Pulmonary:      Effort: No respiratory distress.   Musculoskeletal:         General: Normal range of motion.      Right shoulder: Tenderness present. No swelling or deformity. Normal range of motion. Normal strength.      Cervical back: Normal range of motion.      Comments: Pain with ROM and when arm is dangling by side.  Pain is deep, unable to palpate it.   Neurological:      General: No focal deficit present.      Mental Status: She is alert.   Psychiatric:         Attention and Perception: She is attentive.         Mood and Affect: Mood normal.         Speech:  Speech normal.         Behavior: Behavior normal. Behavior is cooperative.         Thought Content: Thought content normal.         Judgment: Judgment normal.        A/P    Diagnoses and all orders for this visit:    1. Plantar wart of left foot (Primary)  -     Ambulatory Referral to Podiatry    2. Acute pain of right shoulder  -     methylPREDNISolone (Medrol) 4 MG dose pack; Take as directed on package instructions. Dispense: 21 Count dose jean-paul with 0 refills.  Dispense: 1 each; Refill: 0  -     XR Shoulder 2+ View Right; Future  -     Ambulatory Referral to Physical Therapy Evaluate and treat  Acute.  1 week.  No known trauma/injury.  History of playing volleyball.  Right handed.  Will obtain x-ray.  Trial medrol dosepak.  Will refer to PT.  Suspect we will need MRI right shoulder given history of volleyball and description of pain.  Will try conservative options first.  Patient to call if symptoms do not improve or worsen at anytime.  Would proceed with MRI at that time.    3. Irritable bowel syndrome with diarrhea  -     hyoscyamine (LEVSIN) 0.125 MG SL tablet; Take 1 tablet by mouth Every 4 (Four) Hours As Needed for Cramping.  Dispense: 20 tablet; Refill: 0         Plan of care reviewed with patient at the conclusion of today's visit. Education was provided regarding diagnosis, management and any prescribed or recommended OTC medications.  Patient verbalizes understanding of and agreement with management plan.    Dictated Utilizing Dragon Dictation     Please note that portions of this note were completed with a voice recognition program.     Part of this note may be an electronic transcription/translation of spoken language to printed text using the Dragon Dictation System.    No follow-ups on file.     Rosalba Palacios PA-C  Answers submitted by the patient for this visit:  Other (Submitted on 9/16/2023)  Please describe your symptoms.: Bad right shoulder pain and want to remove wart from left  foot  Have you had these symptoms before?: No  How long have you been having these symptoms?: 5-7 days  Primary Reason for Visit (Submitted on 9/16/2023)  What is the primary reason for your visit?: Other

## 2023-09-27 DIAGNOSIS — M25.511 ACUTE PAIN OF RIGHT SHOULDER: ICD-10-CM

## 2023-09-28 RX ORDER — METHYLPREDNISOLONE 4 MG/1
TABLET ORAL
Qty: 21 TABLET | OUTPATIENT
Start: 2023-09-28

## 2023-09-28 NOTE — TELEPHONE ENCOUNTER
Rx Refill Note  Requested Prescriptions     Pending Prescriptions Disp Refills    methylPREDNISolone (MEDROL) 4 MG dose pack [Pharmacy Med Name: METHYLPREDNISOLONE 4 MG DOSEPK] 21 tablet      Sig: TAKE BY MOUTH AS INSTRUCTED - PER PACKAGE INSTRUCTIONS      Last office visit with prescribing clinician: 9/18/2023   Last telemedicine visit with prescribing clinician: Visit date not found   Next office visit with prescribing clinician: Visit date not found                         Would you like a call back once the refill request has been completed: [] Yes [] No    If the office needs to give you a call back, can they leave a voicemail: [] Yes [] No    Gregoria Mcwilliams MA  09/28/23, 08:27 EDT

## 2023-11-20 DIAGNOSIS — M25.511 ACUTE PAIN OF RIGHT SHOULDER: Primary | ICD-10-CM

## 2023-12-23 DIAGNOSIS — K58.0 IRRITABLE BOWEL SYNDROME WITH DIARRHEA: ICD-10-CM

## 2023-12-26 NOTE — TELEPHONE ENCOUNTER
Rx Refill Note  Requested Prescriptions     Pending Prescriptions Disp Refills    hyoscyamine (LEVSIN) 0.125 MG SL tablet [Pharmacy Med Name: HYOSCYAMINE 0.125 MG TAB SL] 20 tablet 0     Sig: DISSOLVE 1 TABLET UNDER THE TONGUE EVERY 4 HOURS AS NEEDED FOR CRAMPING      Last office visit with prescribing clinician: 9/18/2023   Last telemedicine visit with prescribing clinician: Visit date not found   Next office visit with prescribing clinician: Visit date not found                         Would you like a call back once the refill request has been completed: [] Yes [] No    If the office needs to give you a call back, can they leave a voicemail: [] Yes [] No    Ela Onofre MA  12/26/23, 12:41 EST

## 2023-12-27 RX ORDER — LEVONORGESTREL AND ETHINYL ESTRADIOL 0.1-0.02MG
1 KIT ORAL DAILY
Qty: 84 TABLET | Refills: 4 | OUTPATIENT
Start: 2023-12-27

## 2024-02-20 ENCOUNTER — PATIENT MESSAGE (OUTPATIENT)
Dept: OBSTETRICS AND GYNECOLOGY | Facility: CLINIC | Age: 24
End: 2024-02-20
Payer: COMMERCIAL

## 2024-02-20 RX ORDER — LEVONORGESTREL AND ETHINYL ESTRADIOL 0.1-0.02MG
1 KIT ORAL DAILY
Qty: 84 TABLET | Refills: 0 | Status: SHIPPED | OUTPATIENT
Start: 2024-02-20

## 2024-03-17 NOTE — TELEPHONE ENCOUNTER
Done    New Medications Ordered This Visit   Medications   • levonorgestrel-ethinyl estradiol (SHANIKA SANTIAGO LESSINA) 0.1-20 MG-MCG per tablet     Sig: Take 1 tablet by mouth Daily.     Dispense:  28 tablet     Refill:  3      mild

## 2024-04-30 ENCOUNTER — OFFICE VISIT (OUTPATIENT)
Dept: OBSTETRICS AND GYNECOLOGY | Facility: CLINIC | Age: 24
End: 2024-04-30
Payer: COMMERCIAL

## 2024-04-30 VITALS
BODY MASS INDEX: 20.63 KG/M2 | RESPIRATION RATE: 16 BRPM | SYSTOLIC BLOOD PRESSURE: 112 MMHG | WEIGHT: 124 LBS | DIASTOLIC BLOOD PRESSURE: 60 MMHG

## 2024-04-30 DIAGNOSIS — Z30.41 ENCOUNTER FOR SURVEILLANCE OF CONTRACEPTIVE PILLS: ICD-10-CM

## 2024-04-30 DIAGNOSIS — Z01.419 WELL WOMAN EXAM: Primary | ICD-10-CM

## 2024-04-30 PROCEDURE — 99395 PREV VISIT EST AGE 18-39: CPT

## 2024-04-30 PROCEDURE — 99459 PELVIC EXAMINATION: CPT

## 2024-04-30 RX ORDER — LEVONORGESTREL/ETHIN.ESTRADIOL 0.1-0.02MG
1 TABLET ORAL DAILY
Qty: 84 TABLET | Refills: 4 | Status: SHIPPED | OUTPATIENT
Start: 2024-04-30

## 2024-04-30 NOTE — PROGRESS NOTES
Subjective   Chief Complaint   Patient presents with    Annual Exam     David Coyle is a 23 y.o. year old  presenting to be seen for her annual exam. She is overall feeling well today.     SEXUAL Hx:  She is currently sexually active.  In the past year there there has been NO new sexual partners.    Condoms are never used.  She would not like to be screened for STD's at today's exam.  Current birth control method: OCP (estrogen/progesterone).  She is happy with her current method of contraception and does not want to discuss alternative methods of contraception.  MENSTRUAL Hx:  Patient's last menstrual period was 2024.  In the past 6 months her cycles have been regular, predictable and occur monthly.  Her menstrual flow is typically light.   Each month on average there are roughly 0 day(s) of very heavy flow.    Intermenstrual bleeding is absent.    Post-coital bleeding is absent.  Dysmenorrhea: mild and is not affecting her activities of daily living  PMS: mild and is not affecting her activities of daily living  Her cycles are not a source of concern for her that she wishes to discuss today.  HEALTH Hx:  She exercises regularly: yes.  She wears her seat belt: yes.  She has concerns about domestic violence: no.  OTHER THINGS SHE WANTS TO DISCUSS TODAY:  Nothing else    The following portions of the patient's history were reviewed and updated as appropriate:problem list, current medications, allergies, past family history, past medical history, past social history, and past surgical history.    Social History    Tobacco Use      Smoking status: Never      Smokeless tobacco: Never    Past Medical History:   Diagnosis Date    COVID 2023    Irritable bowel - diarrhea predominant     Migraine without status migrainosus, not intractable      Past Surgical History:   Procedure Laterality Date    LAPAROSCOPIC CHOLECYSTECTOMY  2016    TONSILLECTOMY  2020         Review of Systems    Constitutional: Negative.  Negative for appetite change and diaphoresis.   Respiratory: Negative.     Cardiovascular: Negative.    Gastrointestinal: Negative.  Negative for abdominal distention, blood in stool, GERD and indigestion.   Endocrine: Negative.    Genitourinary: Negative.  Negative for breast discharge, breast lump, breast pain, dysuria and hematuria.   Skin: Negative.           Objective   /60   Resp 16   Wt 56.2 kg (124 lb)   LMP 04/23/2024   BMI 20.63 kg/m²     Physical Exam  Vitals reviewed. Exam conducted with a chaperone present.   Cardiovascular:      Rate and Rhythm: Normal rate and regular rhythm.   Pulmonary:      Effort: Pulmonary effort is normal.      Breath sounds: Normal breath sounds.   Chest:   Breasts:     Right: Normal.      Left: Normal.      Comments: Self breast awareness education completed  Abdominal:      General: Bowel sounds are normal.      Palpations: Abdomen is soft.   Genitourinary:     General: Normal vulva.      Exam position: Lithotomy position.      Vagina: Normal.      Cervix: Normal.      Uterus: Normal.       Adnexa: Right adnexa normal and left adnexa normal.      Rectum: Normal.      Comments: Rectal exam not done but appears normal visually  Neurological:      Mental Status: She is oriented to person, place, and time.   Psychiatric:         Mood and Affect: Mood normal.         Behavior: Behavior normal.         Thought Content: Thought content normal.         Judgment: Judgment normal.            Diagnoses and all orders for this visit:    1. Annual GYN exam (Primary)    2. Encounter for surveillance of contraceptive pills    Other orders  -     levonorgestrel-ethinyl estradiol (AVIANE,ALESSE,LESSINA) 0.1-20 MG-MCG per tablet; Take 1 tablet by mouth Daily.  Dispense: 84 tablet; Refill: 4        Prescription(s) for OCP's were refilled today     The importance of keeping all planned follow-up and taking all medications as prescribed was  emphasized.    Today I discussed with David the total recommended calcium intake for a premenopausal female is 1000 mg.  Ideally this should be from dietary sources.  I reviewed calcium content in various foods including milk, fortified orange juice and yogurt.  If she cannot get sufficient calcium through dietary means, it is recommended to supplement with either a multivitamin or calcium to reach her daily goal.  I also reviewed the difference in the bioavailability of calcium carbonate and calcium citrate containing supplements and the importance of taking calcium carbonate containing products with food.  Finally, vitamin D's role in calcium absorption was reviewed and a total daily vitamin D intake of 800 units was recommended.    I discussed with David that she may be behind on needed vaccinations for  vaccines are up to date .  She may be able to obtain these vaccinations at her local pharmacy OR speak about obtaining them with her primary care.  If she does obtain her vaccines, I have asked David to let us know the date each vaccine was obtained so that her medical record could be updated in our system.    New Medications Ordered This Visit   Medications    levonorgestrel-ethinyl estradiol (AVIANE,ALESSE,LESSINA) 0.1-20 MG-MCG per tablet     Sig: Take 1 tablet by mouth Daily.     Dispense:  84 tablet     Refill:  4            Return in about 1 year (around 4/30/2025) for Annual physical.    EDWARD Hernandez  April 30, 2024

## 2024-05-30 DIAGNOSIS — K58.0 IRRITABLE BOWEL SYNDROME WITH DIARRHEA: ICD-10-CM

## 2024-05-31 RX ORDER — TIMOLOL MALEATE 5 MG/ML
1 SOLUTION/ DROPS OPHTHALMIC DAILY
Qty: 84 TABLET | Refills: 4 | OUTPATIENT
Start: 2024-05-31

## 2024-05-31 NOTE — TELEPHONE ENCOUNTER
Rx Refill Note  Requested Prescriptions     Pending Prescriptions Disp Refills    hyoscyamine (LEVSIN) 0.125 MG SL tablet [Pharmacy Med Name: HYOSCYAMINE 0.125 MG TAB SL] 20 tablet 0     Sig: DISSOLVE 1 TABLET UNDER THE TONGUE EVERY 4 HOURS AS NEEDED FOR CRAMPING      Last office visit with prescribing clinician: 9/18/2023   Last telemedicine visit with prescribing clinician: Visit date not found   Next office visit with prescribing clinician: Visit date not found                         Would you like a call back once the refill request has been completed: [] Yes [] No    If the office needs to give you a call back, can they leave a voicemail: [] Yes [] No    Renetta Malcolm MA  05/31/24, 10:14 EDT

## 2024-09-06 ENCOUNTER — OFFICE VISIT (OUTPATIENT)
Dept: FAMILY MEDICINE CLINIC | Facility: CLINIC | Age: 24
End: 2024-09-06
Payer: COMMERCIAL

## 2024-09-06 VITALS
DIASTOLIC BLOOD PRESSURE: 62 MMHG | TEMPERATURE: 98.3 F | WEIGHT: 121.4 LBS | HEIGHT: 65 IN | OXYGEN SATURATION: 99 % | HEART RATE: 72 BPM | SYSTOLIC BLOOD PRESSURE: 94 MMHG | BODY MASS INDEX: 20.23 KG/M2

## 2024-09-06 DIAGNOSIS — F41.9 ANXIETY: Primary | ICD-10-CM

## 2024-09-06 PROCEDURE — 99213 OFFICE O/P EST LOW 20 MIN: CPT | Performed by: PHYSICIAN ASSISTANT

## 2024-09-06 RX ORDER — HYDROXYZINE PAMOATE 25 MG/1
25 CAPSULE ORAL 3 TIMES DAILY PRN
Qty: 30 CAPSULE | Refills: 0 | Status: SHIPPED | OUTPATIENT
Start: 2024-09-06

## 2024-09-06 RX ORDER — FLUOXETINE 10 MG/1
10 CAPSULE ORAL DAILY
Qty: 30 CAPSULE | Refills: 2 | Status: SHIPPED | OUTPATIENT
Start: 2024-09-06

## 2024-09-06 RX ORDER — ONDANSETRON 4 MG/1
4 TABLET, ORALLY DISINTEGRATING ORAL EVERY 8 HOURS PRN
Qty: 20 TABLET | Refills: 0 | Status: SHIPPED | OUTPATIENT
Start: 2024-09-06

## 2024-09-07 NOTE — PROGRESS NOTES
Chief Complaint   Patient presents with    Anxiety         David Coyle is a 24 y.o. female who presents for Anxiety.  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  She is interested in trying medication.  Has not been on any medications in the past.      Past Medical History:   Diagnosis Date    COVID 03/2023    GERD (gastroesophageal reflux disease) 2017    Irritable bowel - diarrhea predominant 2015    Migraine without status migrainosus, not intractable 2014       Past Surgical History:   Procedure Laterality Date    LAPAROSCOPIC CHOLECYSTECTOMY  02/2016    TONSILLECTOMY  07/2020       Family History   Problem Relation Age of Onset    Migraines Mother     Depression Mother     Irritable bowel syndrome Mother     Arthritis Maternal Grandmother     Hyperlipidemia Maternal Grandfather     Diabetes Maternal Grandfather     Arthritis Paternal Grandmother     Colon cancer Paternal Grandmother     Colon cancer Paternal Grandfather 70    Heart disease Paternal Grandfather     Hyperlipidemia Paternal Grandfather     Ulcerative colitis Maternal Uncle     Breast cancer Neg Hx     Ovarian cancer Neg Hx     Prostate cancer Neg Hx        Social History     Socioeconomic History    Marital status:    Tobacco Use    Smoking status: Never    Smokeless tobacco: Never   Vaping Use    Vaping status: Never Used   Substance and Sexual Activity    Alcohol use: Not Currently    Drug use: Never    Sexual activity: Yes     Partners: Male     Birth control/protection: OCP       Allergies   Allergen Reactions    Bactrim [Sulfamethoxazole-Trimethoprim] Swelling     THROAT     Toradol [Ketorolac Tromethamine] Hives       ROS    Review of Systems   Constitutional:  Negative for chills and fever.   Respiratory:  Negative for cough.    Cardiovascular:  Negative for chest pain and palpitations.   Gastrointestinal:  Negative for nausea.   Neurological:  Negative for dizziness  "and confusion.   Psychiatric/Behavioral:  Positive for stress. Negative for depressed mood. The patient is nervous/anxious.        Vitals:    24 1511   BP: 94/62   BP Location: Left arm   Patient Position: Sitting   Cuff Size: Adult   Pulse: 72   Temp: 98.3 °F (36.8 °C)   TempSrc: Oral   SpO2: 99%   Weight: 55.1 kg (121 lb 6.4 oz)   Height: 165.1 cm (65\")     Body mass index is 20.2 kg/m².    Current Outpatient Medications on File Prior to Visit   Medication Sig Dispense Refill    famotidine (Pepcid) 40 MG tablet Take 1 tablet by mouth Daily. 30 tablet 5    hyoscyamine (LEVSIN) 0.125 MG SL tablet DISSOLVE 1 TABLET UNDER THE TONGUE EVERY 4 HOURS AS NEEDED FOR CRAMPING 20 tablet 0    levonorgestrel-ethinyl estradiol (AVIANE,ALESSE,LESSINA) 0.1-20 MG-MCG per tablet Take 1 tablet by mouth Daily. 84 tablet 4    multivitamin (DAILY DOMENICO) tablet tablet Take  by mouth Daily.       No current facility-administered medications on file prior to visit.       Results for orders placed or performed in visit on 23   LIQUID-BASED PAP SMEAR[facility] ([facility])    Specimen: ThinPrep Vial   Result Value Ref Range    Reference Lab Report       [facility]  [address] [place] [address]  Phone: [phone] or [phone]  Fax: [phone]  Antwan Huggins M.D., Medical Director    PATIENT NAME                                     LABORATORY NO.  FOREST PYLE.                        [id number]  [id number]                                 AGE                    SEX   SSN              CLIENT REF #  YAZMIN PUENET MD                       2000      F     [id number]      [id number]    YAZMIN PUENTE MD                    REQUESTING MSHARAN.           ATTENDING M.D.         COPY TO.  [address] [facility] [address]              [place] [address]                        DATE COLLECTED            DATE RECEIVED          DATE " REPORTED  03/23/2023 03/23/2023 03/24/2023    ThinPrep Pap with [facility]    DIAGNOSIS:  Negative for intraepithelial lesion or malignancy    Multiple factors can influence accuracy of  Pap tests; therefore, screening at  regular intervals is necessary for early cancer detection.      SPECIMEN ADEQUACY:  SATISFACTORY FOR EVALUATION  Partially obscuring inflammation is present.  Sparse cellularity, minimal number of squamous cells available for evaluation.  SOURCE OF SPECIMEN:       CERVICAL  SLIDES:  1  CLINICAL HISTORY:  Well woman exam      CYTOTECHNOLOGIST:             JOEL CT (ASCP)    CPT CODES:  57413         PE    Physical Exam  Vitals reviewed.   Constitutional:       General: She is not in acute distress.     Appearance: Normal appearance. She is well-developed. She is not ill-appearing or diaphoretic.   HENT:      Head: Normocephalic and atraumatic.   Eyes:      Extraocular Movements: Extraocular movements intact.      Conjunctiva/sclera: Conjunctivae normal.   Pulmonary:      Effort: No respiratory distress.   Musculoskeletal:         General: Normal range of motion.      Cervical back: Normal range of motion.   Neurological:      General: No focal deficit present.      Mental Status: She is alert.   Psychiatric:         Attention and Perception: Attention and perception normal. She is attentive.         Mood and Affect: Affect normal. Mood is anxious.         Speech: Speech normal.         Behavior: Behavior normal. Behavior is cooperative.         Thought Content: Thought content normal.         Cognition and Memory: Cognition and memory normal.         Judgment: Judgment normal.          A/P    Diagnoses and all orders for this visit:    1. Anxiety (Primary)  -     FLUoxetine (PROzac) 10 MG capsule; Take 1 capsule by mouth Daily.  Dispense: 30 capsule; Refill: 2  -     hydrOXYzine pamoate (Vistaril) 25 MG capsule; Take 1 capsule by mouth 3 (Three) Times a Day As Needed  for Itching.  Dispense: 30 capsule; Refill: 0  -     ondansetron ODT (ZOFRAN-ODT) 4 MG disintegrating tablet; Place 1 tablet on the tongue Every 8 (Eight) Hours As Needed for Nausea or Vomiting.  Dispense: 20 tablet; Refill: 0  Worsening stress and anxiety with residency.  Will trial prozac 10 mg daily, vistaril 25 mg as needed.  She also gets nauseous with significant anxiety and has trouble eating.  Will provide zofran to help temporarily until the prozac is in her system.  Discussed risks, benefits, adverse effects and duration of action.  Call if she has any concerns.  Return in 1 month.       Plan of care reviewed with patient at the conclusion of today's visit. Education was provided regarding diagnosis, management and any prescribed or recommended OTC medications.  Patient verbalizes understanding of and agreement with management plan.    Dictated Utilizing Dragon Dictation     Please note that portions of this note were completed with a voice recognition program.     Part of this note may be an electronic transcription/translation of spoken language to printed text using the Dragon Dictation System.    Return in about 4 weeks (around 10/4/2024) for Recheck, anxiety .     Rosalba Palacios PA-C    Answers submitted by the patient for this visit:  Primary Reason for Visit (Submitted on 9/6/2024)  What is the primary reason for your visit?: Anxiety  Anxiety (Submitted on 9/6/2024)  Chief Complaint: Anxiety  Visit: initial  Onset: 1 to 6 months ago  Progression since onset: unchanged  Frequency: constantly  Severity: moderate  dry mouth: No  excessive worry: Yes  insomnia: No  irritability: No  malaise/fatigue: Yes  obsessions: No  Sleep quality: good  Hours of sleep per night: 7 Hours  Aggravated by: work stress

## 2024-10-11 ENCOUNTER — OFFICE VISIT (OUTPATIENT)
Dept: FAMILY MEDICINE CLINIC | Facility: CLINIC | Age: 24
End: 2024-10-11
Payer: COMMERCIAL

## 2024-10-11 VITALS
DIASTOLIC BLOOD PRESSURE: 72 MMHG | OXYGEN SATURATION: 98 % | BODY MASS INDEX: 20.09 KG/M2 | HEIGHT: 65 IN | SYSTOLIC BLOOD PRESSURE: 106 MMHG | WEIGHT: 120.6 LBS | HEART RATE: 82 BPM

## 2024-10-11 DIAGNOSIS — F41.9 ANXIETY: ICD-10-CM

## 2024-10-11 PROCEDURE — 99213 OFFICE O/P EST LOW 20 MIN: CPT | Performed by: PHYSICIAN ASSISTANT

## 2024-10-11 RX ORDER — FLUOXETINE 10 MG/1
10 CAPSULE ORAL DAILY
Qty: 90 CAPSULE | Refills: 0 | Status: SHIPPED | OUTPATIENT
Start: 2024-10-11

## 2024-10-11 NOTE — PROGRESS NOTES
Chief Complaint   Patient presents with    Anxiety       David Coyle is a pleasant 24 y.o. female who is here for routine follow-up of anxiety.  Patient was recently started on Prozac 10 mg daily.  She reports she is doing well with the medication.  Her anxiety has improved since starting the medication.  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  She denies any adverse effects.    Past Medical History:   Diagnosis Date    COVID 03/2023    GERD (gastroesophageal reflux disease) 2017    Irritable bowel - diarrhea predominant 2015    Migraine without status migrainosus, not intractable 2014       Past Surgical History:   Procedure Laterality Date    LAPAROSCOPIC CHOLECYSTECTOMY  02/2016    TONSILLECTOMY  07/2020       Family History   Problem Relation Age of Onset    Migraines Mother     Depression Mother     Irritable bowel syndrome Mother     Arthritis Maternal Grandmother     Hyperlipidemia Maternal Grandfather     Diabetes Maternal Grandfather     Arthritis Paternal Grandmother     Colon cancer Paternal Grandmother     Colon cancer Paternal Grandfather 70    Heart disease Paternal Grandfather     Hyperlipidemia Paternal Grandfather     Ulcerative colitis Maternal Uncle     Breast cancer Neg Hx     Ovarian cancer Neg Hx     Prostate cancer Neg Hx        Social History     Socioeconomic History    Marital status:    Tobacco Use    Smoking status: Never    Smokeless tobacco: Never   Vaping Use    Vaping status: Never Used   Substance and Sexual Activity    Alcohol use: Not Currently    Drug use: Never    Sexual activity: Yes     Partners: Male     Birth control/protection: OCP       Allergies   Allergen Reactions    Bactrim [Sulfamethoxazole-Trimethoprim] Swelling     THROAT     Toradol [Ketorolac Tromethamine] Hives       ROS  Review of Systems   Psychiatric/Behavioral:  Positive for stress. The patient is nervous/anxious.        Vitals:    10/11/24 1455  "  BP: 106/72   BP Location: Left arm   Patient Position: Sitting   Cuff Size: Adult   Pulse: 82   SpO2: 98%   Weight: 54.7 kg (120 lb 9.6 oz)   Height: 165.1 cm (65\")     Body mass index is 20.07 kg/m².    BMI is within normal parameters. No other follow-up for BMI required.      Current Outpatient Medications on File Prior to Visit   Medication Sig Dispense Refill    famotidine (Pepcid) 40 MG tablet Take 1 tablet by mouth Daily. 30 tablet 5    hydrOXYzine pamoate (Vistaril) 25 MG capsule Take 1 capsule by mouth 3 (Three) Times a Day As Needed for Itching. 30 capsule 0    hyoscyamine (LEVSIN) 0.125 MG SL tablet DISSOLVE 1 TABLET UNDER THE TONGUE EVERY 4 HOURS AS NEEDED FOR CRAMPING 20 tablet 0    levonorgestrel-ethinyl estradiol (AVIANE,ALESSE,LESSINA) 0.1-20 MG-MCG per tablet Take 1 tablet by mouth Daily. 84 tablet 4    multivitamin (DAILY DOMENICO) tablet tablet Take  by mouth Daily.      ondansetron ODT (ZOFRAN-ODT) 4 MG disintegrating tablet Place 1 tablet on the tongue Every 8 (Eight) Hours As Needed for Nausea or Vomiting. 20 tablet 0    [DISCONTINUED] FLUoxetine (PROzac) 10 MG capsule Take 1 capsule by mouth Daily. 30 capsule 2     No current facility-administered medications on file prior to visit.       Results for orders placed or performed in visit on 23   LIQUID-BASED PAP SMEAR[facility] ([facility])    Specimen: ThinPrep Vial   Result Value Ref Range    Reference Lab Report       [facility]  [address] [place] [address]  Phone: [phone] or [phone]  Fax: [phone]  Antwan Huggins M.D., Medical Director    PATIENT NAME                                     LABORATORY NO.  FOREST PYLE.                        [id number]  [id number]                                 AGE                    SEX   SSN              CLIENT REF #  YAZMIN PUENTE MD                       22        2000      F     [id number]      [id number]    YAZMIN " NAHID PUENTE MD                    REQUESTING M.D.           ATTENDING M.D.         COPY TO.  [address] [facility] [address]              [place] [address]                        DATE COLLECTED            DATE RECEIVED          DATE REPORTED  03/23/2023 03/23/2023 03/24/2023    ThinPrep Pap with [facility]    DIAGNOSIS:  Negative for intraepithelial lesion or malignancy    Multiple factors can influence accuracy of  Pap tests; therefore, screening at  regular intervals is necessary for early cancer detection.      SPECIMEN ADEQUACY:  SATISFACTORY FOR EVALUATION  Partially obscuring inflammation is present.  Sparse cellularity, minimal number of squamous cells available for evaluation.  SOURCE OF SPECIMEN:       CERVICAL  SLIDES:  1  CLINICAL HISTORY:  Well woman exam      CYTOTECHNOLOGIST:             ETJIM PLASENCIA (ASCP)    CPT CODES:  48171         PE    Physical Exam  Vitals reviewed.   Constitutional:       General: She is not in acute distress.     Appearance: Normal appearance. She is well-developed. She is not ill-appearing or diaphoretic.   HENT:      Head: Normocephalic and atraumatic.   Eyes:      Extraocular Movements: Extraocular movements intact.      Conjunctiva/sclera: Conjunctivae normal.   Pulmonary:      Effort: No respiratory distress.   Musculoskeletal:         General: Normal range of motion.      Cervical back: Normal range of motion.   Neurological:      General: No focal deficit present.      Mental Status: She is alert.   Psychiatric:         Attention and Perception: Attention and perception normal. She is attentive.         Mood and Affect: Mood and affect normal.         Speech: Speech normal.         Behavior: Behavior normal. Behavior is cooperative.         Thought Content: Thought content normal.         Cognition and Memory: Cognition and memory normal.         Judgment: Judgment normal.           A/P    Diagnoses and all orders for this  visit:    1. Anxiety  -     FLUoxetine (PROzac) 10 MG capsule; Take 1 capsule by mouth Daily.  Dispense: 90 capsule; Refill: 0  Anxiety has improved.  Tolerating Prozac well.  Doesn't want to increase dose.  Will call if she has any concerns.       Plan of care reviewed with patient at the conclusion of today's visit. Education was provided regarding diagnosis, management and any prescribed or recommended OTC medications.  Patient verbalizes understanding of and agreement with management plan.    Dictated Utilizing Dragon Dictation     Please note that portions of this note were completed with a voice recognition program.     Part of this note may be an electronic transcription/translation of spoken language to printed text using the Dragon Dictation System.    Return in about 1 year (around 10/12/2025) for Annual physical.     Rosalba Palacios PA-C

## 2025-02-01 DIAGNOSIS — F41.9 ANXIETY: ICD-10-CM

## 2025-02-03 RX ORDER — FLUOXETINE 10 MG/1
10 CAPSULE ORAL DAILY
Qty: 90 CAPSULE | Refills: 0 | Status: SHIPPED | OUTPATIENT
Start: 2025-02-03

## 2025-02-03 NOTE — TELEPHONE ENCOUNTER
Rx Refill Note  Requested Prescriptions     Pending Prescriptions Disp Refills    FLUoxetine (PROzac) 10 MG capsule [Pharmacy Med Name: FLUOXETINE HCL 10 MG CAPSULE] 90 capsule 0     Sig: TAKE 1 CAPSULE BY MOUTH DAILY      Last office visit with prescribing clinician: 10/11/2024   Last telemedicine visit with prescribing clinician: Visit date not found   Next office visit with prescribing clinician: Visit date not found     Dawna Flores MA  02/03/25, 09:35 EST

## 2025-02-23 DIAGNOSIS — F41.9 ANXIETY: ICD-10-CM

## 2025-02-24 RX ORDER — FLUOXETINE 10 MG/1
10 CAPSULE ORAL DAILY
Qty: 90 CAPSULE | Refills: 0 | Status: SHIPPED | OUTPATIENT
Start: 2025-02-24

## 2025-02-24 NOTE — TELEPHONE ENCOUNTER
Rx Refill Note  Requested Prescriptions     Pending Prescriptions Disp Refills    FLUoxetine (PROzac) 10 MG capsule 90 capsule 0     Sig: Take 1 capsule by mouth Daily.      Last office visit with prescribing clinician: 10/11/2024   Last telemedicine visit with prescribing clinician: Visit date not found   Next office visit with prescribing clinician: Visit date not found                         Would you like a call back once the refill request has been completed: [] Yes [] No    If the office needs to give you a call back, can they leave a voicemail: [] Yes [] No    Lisa Mccallum MA  02/24/25, 10:20 EST

## 2025-02-28 RX ORDER — LEVONORGESTREL/ETHIN.ESTRADIOL 0.1-0.02MG
1 TABLET ORAL DAILY
Qty: 84 TABLET | Refills: 0 | Status: SHIPPED | OUTPATIENT
Start: 2025-02-28

## 2025-05-05 ENCOUNTER — OFFICE VISIT (OUTPATIENT)
Dept: OBSTETRICS AND GYNECOLOGY | Facility: CLINIC | Age: 25
End: 2025-05-05
Payer: COMMERCIAL

## 2025-05-05 VITALS
SYSTOLIC BLOOD PRESSURE: 110 MMHG | WEIGHT: 131 LBS | BODY MASS INDEX: 21.8 KG/M2 | RESPIRATION RATE: 14 BRPM | DIASTOLIC BLOOD PRESSURE: 76 MMHG

## 2025-05-05 DIAGNOSIS — Z71.85 VACCINE COUNSELING: ICD-10-CM

## 2025-05-05 DIAGNOSIS — Z01.419 WELL WOMAN EXAM: Primary | ICD-10-CM

## 2025-05-05 DIAGNOSIS — Z71.3 NUTRITIONAL COUNSELING: ICD-10-CM

## 2025-05-05 PROBLEM — F41.9 ANXIETY: Status: ACTIVE | Noted: 2024-01-01

## 2025-05-05 PROCEDURE — 99395 PREV VISIT EST AGE 18-39: CPT | Performed by: OBSTETRICS & GYNECOLOGY

## 2025-05-05 RX ORDER — LEVONORGESTREL/ETHIN.ESTRADIOL 0.1-0.02MG
1 TABLET ORAL DAILY
Qty: 84 TABLET | Refills: 4 | Status: SHIPPED | OUTPATIENT
Start: 2025-05-05

## 2025-05-05 NOTE — PROGRESS NOTES
Subjective   Chief Complaint   Patient presents with    Gynecologic Exam     David Coyle is a 24 y.o. year old  presenting to be seen for her annual exam.  Still has longstanding diarrhea predominant irritable bowel that has been stable.  This past year she did get started on fluoxetine for anxiety and is doing well with that.  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    SEXUAL Hx:  She is currently sexually active.  In the past year there there has been NO new sexual partners.    Condoms are never used.  She would not like to be screened for STD's at today's exam.  Current birth control method: OCP (estrogen/progesterone).  She is happy with her current method of contraception and does not want to discuss alternative methods of contraception.  MENSTRUAL Hx:  Patient's last menstrual period was 2025 (approximate).  In the past 6 months her cycles have been regular, predictable and occur monthly.  Her menstrual flow is typically normal.   Each month on average there are roughly 0 day(s) of very heavy flow.  Intermenstrual bleeding is absent.    Post-coital bleeding is absent.  Dysmenorrhea: is not affecting her activities of daily living  PMS: is not affecting her activities of daily living  Her cycles are not a source of concern for her that she wishes to discuss today.  HEALTH Hx:  She exercises regularly: yes.  She wears her seat belt: yes.  She has concerns about domestic violence: no.    The following portions of the patient's history were reviewed and updated as appropriate:problem list, current medications, allergies, past family history, past medical history, past social history, and past surgical history.    Social History    Tobacco Use      Smoking status: Never      Smokeless tobacco: Never    Review of Systems  Constitutional POS: nothing reported    NEG:  anorexia or night sweats   Genitourinary POS: nothing reported    NEG: dysuria or hematuria      Gastointestinal POS: see HPI    NEG: bloating, change in bowel habits, melena, or reflux symptoms   Integument POS: nothing reported    NEG: moles that are changing in size, shape, color or rashes   Breast POS: nothing reported    NEG: persistent breast lump, skin dimpling, or nipple discharge        Objective   /76   Resp 14   Wt 59.4 kg (131 lb)   LMP 04/29/2025 (Approximate)   BMI 21.80 kg/m²     General:  well developed; well nourished  no acute distress  mentation appropriate   Skin:  No suspicious lesions seen   Thyroid: normal to inspection and palpation   Breasts:  Examined in supine position  Symmetric without masses or skin dimpling  Nipples normal without inversion, lesions or discharge  There are no palpable axillary nodes   Abdomen: soft, non-tender; no masses  no umbilical or inguinal hernias are present  no hepato-splenomegaly   Pelvis: Clinical staff was present for exam  External genitalia:  normal appearance of the external genitalia including Bartholin's and [place]'s glands.  :  urethral meatus normal; urethra normal:  Vaginal:  normal pink mucosa without prolapse or lesions.  Cervix:  normal appearance.  Uterus:  normal size, shape and consistency.  Adnexa:  normal bimanual exam of the adnexa.  Rectal:  digital rectal exam not performed; anus visually normal appearing.        Assessment   Normal GYN exam     Plan   Pap was not done today.  I explained to David that the recommendations for Pap smear interval in a low risk patient has lengthened to 3 years time.  I told David she still needs to be seen in our office yearly for a full physical including breast and pelvic exam.  I have counseled the patient on the importance of staying up to date with preventive vaccines as well as the risks and benefits of these vaccines.  Her vaccine record was reviewed and updated.  Today I discussed  with David the total recommended calcium intake for a premenopausal female is 1000 mg.  Ideally this should be from dietary sources.  I reviewed calcium content in various foods including milk, fortified orange juice and yogurt.  If she cannot get sufficient calcium through dietary means, it is recommended to supplement with either a multivitamin or calcium to reach her daily goal.  I also reviewed the difference in the bioavailability of calcium carbonate and calcium citrate containing supplements and the importance of taking calcium carbonate containing products with food.  Finally, vitamin D's role in calcium absorption was reviewed and a total daily vitamin D intake of 800 units was recommended.  The importance of keeping all planned follow-up and taking all medications as prescribed was emphasized.  Follow up for annual exam 1 year    New Medications Ordered This Visit   Medications    levonorgestrel-ethinyl estradiol (AVIANE,ALESSE,LESSINA) 0.1-20 MG-MCG per tablet     Sig: Take 1 tablet by mouth Daily.     Dispense:  84 tablet     Refill:  4          This note was electronically signed.    Noman Aguilar M.D.  May 5, 2025    Part of this note may be an electronic transcription/translation of spoken language to printed text using the Dragon Dictation System.

## 2025-05-28 ENCOUNTER — LAB (OUTPATIENT)
Dept: LAB | Facility: HOSPITAL | Age: 25
End: 2025-05-28
Payer: COMMERCIAL

## 2025-05-28 ENCOUNTER — OFFICE VISIT (OUTPATIENT)
Dept: FAMILY MEDICINE CLINIC | Facility: CLINIC | Age: 25
End: 2025-05-28
Payer: COMMERCIAL

## 2025-05-28 VITALS
OXYGEN SATURATION: 98 % | BODY MASS INDEX: 21.83 KG/M2 | HEIGHT: 65 IN | SYSTOLIC BLOOD PRESSURE: 104 MMHG | HEART RATE: 88 BPM | WEIGHT: 131 LBS | DIASTOLIC BLOOD PRESSURE: 70 MMHG

## 2025-05-28 DIAGNOSIS — F41.9 ANXIETY: ICD-10-CM

## 2025-05-28 DIAGNOSIS — Z00.00 ANNUAL PHYSICAL EXAM: Primary | ICD-10-CM

## 2025-05-28 DIAGNOSIS — Z00.00 ANNUAL PHYSICAL EXAM: ICD-10-CM

## 2025-05-28 DIAGNOSIS — Z13.220 SCREENING FOR LIPID DISORDERS: ICD-10-CM

## 2025-05-28 DIAGNOSIS — K58.0 IRRITABLE BOWEL SYNDROME WITH DIARRHEA: ICD-10-CM

## 2025-05-28 DIAGNOSIS — Z13.29 SCREENING FOR THYROID DISORDER: ICD-10-CM

## 2025-05-28 DIAGNOSIS — Z13.0 SCREENING FOR DEFICIENCY ANEMIA: ICD-10-CM

## 2025-05-28 LAB
ALBUMIN SERPL-MCNC: 4.4 G/DL (ref 3.5–5.2)
ALBUMIN/GLOB SERPL: 1.4 G/DL
ALP SERPL-CCNC: 72 U/L (ref 39–117)
ALT SERPL W P-5'-P-CCNC: 11 U/L (ref 1–33)
ANION GAP SERPL CALCULATED.3IONS-SCNC: 10.4 MMOL/L (ref 5–15)
AST SERPL-CCNC: 22 U/L (ref 1–32)
BILIRUB SERPL-MCNC: 1.6 MG/DL (ref 0–1.2)
BUN SERPL-MCNC: 11 MG/DL (ref 6–20)
BUN/CREAT SERPL: 12.8 (ref 7–25)
CALCIUM SPEC-SCNC: 9.4 MG/DL (ref 8.6–10.5)
CHLORIDE SERPL-SCNC: 103 MMOL/L (ref 98–107)
CHOLEST SERPL-MCNC: 187 MG/DL (ref 0–200)
CO2 SERPL-SCNC: 25.6 MMOL/L (ref 22–29)
CREAT SERPL-MCNC: 0.86 MG/DL (ref 0.57–1)
DEPRECATED RDW RBC AUTO: 37.6 FL (ref 37–54)
EGFRCR SERPLBLD CKD-EPI 2021: 95.1 ML/MIN/1.73
ERYTHROCYTE [DISTWIDTH] IN BLOOD BY AUTOMATED COUNT: 11.3 % (ref 12.3–15.4)
GLOBULIN UR ELPH-MCNC: 3.1 GM/DL
GLUCOSE SERPL-MCNC: 73 MG/DL (ref 65–99)
HBA1C MFR BLD: 5.1 % (ref 4.8–5.6)
HCT VFR BLD AUTO: 43.1 % (ref 34–46.6)
HDLC SERPL QL: 2.75
HDLC SERPL-MCNC: 68 MG/DL (ref 40–60)
HGB BLD-MCNC: 14.5 G/DL (ref 12–15.9)
LDLC SERPL CALC-MCNC: 102 MG/DL (ref 0–100)
MCH RBC QN AUTO: 31 PG (ref 26.6–33)
MCHC RBC AUTO-ENTMCNC: 33.6 G/DL (ref 31.5–35.7)
MCV RBC AUTO: 92.1 FL (ref 79–97)
PLATELET # BLD AUTO: 273 10*3/MM3 (ref 140–450)
PMV BLD AUTO: 10.8 FL (ref 6–12)
POTASSIUM SERPL-SCNC: 4 MMOL/L (ref 3.5–5.2)
PROT SERPL-MCNC: 7.5 G/DL (ref 6–8.5)
RBC # BLD AUTO: 4.68 10*6/MM3 (ref 3.77–5.28)
SODIUM SERPL-SCNC: 139 MMOL/L (ref 136–145)
TRIGL SERPL-MCNC: 92 MG/DL (ref 0–150)
TSH SERPL DL<=0.05 MIU/L-ACNC: 1.02 UIU/ML (ref 0.27–4.2)
VLDLC SERPL-MCNC: 17 MG/DL (ref 5–40)
WBC NRBC COR # BLD AUTO: 5.09 10*3/MM3 (ref 3.4–10.8)

## 2025-05-28 PROCEDURE — 80050 GENERAL HEALTH PANEL: CPT

## 2025-05-28 PROCEDURE — 80061 LIPID PANEL: CPT

## 2025-05-28 PROCEDURE — 83036 HEMOGLOBIN GLYCOSYLATED A1C: CPT

## 2025-05-28 PROCEDURE — 99395 PREV VISIT EST AGE 18-39: CPT | Performed by: STUDENT IN AN ORGANIZED HEALTH CARE EDUCATION/TRAINING PROGRAM

## 2025-05-28 RX ORDER — HYDROXYZINE PAMOATE 25 MG/1
25 CAPSULE ORAL 3 TIMES DAILY PRN
Qty: 30 CAPSULE | Refills: 3 | Status: SHIPPED | OUTPATIENT
Start: 2025-05-28

## 2025-05-28 RX ORDER — ONDANSETRON 4 MG/1
4 TABLET, ORALLY DISINTEGRATING ORAL EVERY 8 HOURS PRN
Qty: 20 TABLET | Refills: 3 | Status: SHIPPED | OUTPATIENT
Start: 2025-05-28

## 2025-05-28 RX ORDER — FLUOXETINE 10 MG/1
10 CAPSULE ORAL DAILY
Qty: 90 CAPSULE | Refills: 3 | Status: SHIPPED | OUTPATIENT
Start: 2025-05-28

## 2025-05-28 RX ORDER — HYOSCYAMINE SULFATE 0.12 MG/1
0.12 TABLET SUBLINGUAL EVERY 6 HOURS PRN
Qty: 20 TABLET | Refills: 3 | Status: SHIPPED | OUTPATIENT
Start: 2025-05-28

## 2025-05-28 NOTE — ASSESSMENT & PLAN NOTE
Symptoms controlled  Orders:    hyoscyamine (LEVSIN) 0.125 MG SL tablet; Place 1 tablet under the tongue Every 6 (Six) Hours As Needed for Cramping.

## 2025-05-28 NOTE — ASSESSMENT & PLAN NOTE
Well controlled  Cont current regimen as below  Orders:    FLUoxetine (PROzac) 10 MG capsule; Take 1 capsule by mouth Daily.    hydrOXYzine pamoate (Vistaril) 25 MG capsule; Take 1 capsule by mouth 3 (Three) Times a Day As Needed for Anxiety.    ondansetron ODT (ZOFRAN-ODT) 4 MG disintegrating tablet; Place 1 tablet on the tongue Every 8 (Eight) Hours As Needed for Nausea or Vomiting.

## 2025-05-28 NOTE — PROGRESS NOTES
Female Physical Note      Date: 2025   Patient Name: David Coyle  : 2000   MRN: [id number]     Chief Complaint:    Chief Complaint   Patient presents with    Annual Exam       History of Present Illness: David Coyle is a 25 y.o. female who is here today for their annual health maintenance and physical.    HPI  Presents today for annual.  No acute concerns.  Needs biometric form completed for work.  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    Needs refill on medications.   Prozac and vistaril PRN cont to work well for mood.     Takes Gupta only PRN. Famotidine PRN OTC and these work well.    Saw OB/Gyn earlier this month for annual with pap.  On OCPs for contraception and cycles are regular with this.      Eye exam is UTD.   Dental exam UTD.    Sleeping well.   Exercises when able.      Subjective      Review of Systems:   Denies vision/hearing changes, chest pain, SOB, abdominal pain.     Past Medical History, Social History, Family History and Care Team were all reviewed with patient and updated as appropriate.     Medications:     Current Outpatient Medications:     famotidine (Pepcid) 40 MG tablet, Take 1 tablet by mouth Daily., Disp: 30 tablet, Rfl: 5    FLUoxetine (PROzac) 10 MG capsule, Take 1 capsule by mouth Daily., Disp: 90 capsule, Rfl: 3    hydrOXYzine pamoate (Vistaril) 25 MG capsule, Take 1 capsule by mouth 3 (Three) Times a Day As Needed for Anxiety., Disp: 30 capsule, Rfl: 3    hyoscyamine (LEVSIN) 0.125 MG SL tablet, Place 1 tablet under the tongue Every 6 (Six) Hours As Needed for Cramping., Disp: 20 tablet, Rfl: 3    levonorgestrel-ethinyl estradiol (AVIANE,ALESSE,LESSINA) 0.1-20 MG-MCG per tablet, Take 1 tablet by mouth Daily., Disp: 84 tablet, Rfl: 4    multivitamin (DAILY DOMENICO) tablet tablet, Take  by mouth Daily., Disp: , Rfl:     ondansetron ODT (ZOFRAN-ODT) 4 MG  disintegrating tablet, Place 1 tablet on the tongue Every 8 (Eight) Hours As Needed for Nausea or Vomiting., Disp: 20 tablet, Rfl: 3    Allergies:   Allergies   Allergen Reactions    Bactrim [Sulfamethoxazole-Trimethoprim] Swelling     THROAT     Toradol [Ketorolac Tromethamine] Hives       Immunizations:  Immunization History   Administered Date(s) Administered    COVID-19 (PFIZER) 12YRS+ (COMIRNATY) 10/06/2023    COVID-19 (PFIZER) Purple Cap Monovalent 01/15/2021, 02/20/2021, 01/10/2022    Fluzone  >6mos 10/07/2024    Fluzone (or Fluarix & Flulaval for VFC) >6mos 09/05/2020, 10/07/2021, 09/22/2022, 09/19/2023    HPV Quadrivalent 05/12/2015, 07/15/2015, 12/30/2015    Hepatitis A 05/12/2015    Hepatitis B Adult/Adolescent IM 2000, 2000, 08/27/1999    MMR 08/27/1999, 05/09/2002    PPD Test 07/31/2020, 08/10/2020    Tdap 07/10/2009, 07/31/2020    Varicella 05/10/1999, 07/10/2009       Pap:     Last Completed Pap Smear            Upcoming       PAP SMEAR (Every 3 Years) Next due on 3/23/2026      03/23/2023  LIQUID-BASED PAP SMEAR, [facility] ([facility])    10/15/2020  Pap IG, Ct-Ng, Rfx HPV ASCU                           Lipid panel: Ordered  The ASCVD Risk score (Matheus DK, et al., 2019) failed to calculate for the following reasons:    The 2019 ASCVD risk score is only valid for ages 40 to 79    Tobacco Use: Low Risk  (5/28/2025)    Patient History     Smoking Tobacco Use: Never     Smokeless Tobacco Use: Never     Passive Exposure: Not on file       Social History     Substance and Sexual Activity   Alcohol Use Not Currently        Social History     Substance and Sexual Activity   Drug Use Never        PHQ-2 Depression Screening  Little interest or pleasure in doing things? Not at all   Feeling down, depressed, or hopeless? Not at all   PHQ-2 Total Score 0     PHQ-9 Total Score:     Objective     Physical Exam:  Vital Signs:   Vitals:    05/28/25 0755   BP: 104/70   Pulse: 88   SpO2: 98%   Weight:  "59.4 kg (131 lb)   Height: 165.1 cm (65\")     Body mass index is 21.8 kg/m².     Physical Exam  Constitutional:       Appearance: She is normal weight. She is not ill-appearing.   HENT:      Head: Normocephalic and atraumatic.      Right Ear: Tympanic membrane normal.      Left Ear: Tympanic membrane normal.      Mouth/Throat:      Mouth: Mucous membranes are moist.      Pharynx: Oropharynx is clear. No oropharyngeal exudate or posterior oropharyngeal erythema.   Eyes:      Extraocular Movements: Extraocular movements intact.      Conjunctiva/sclera: Conjunctivae normal.   Cardiovascular:      Rate and Rhythm: Normal rate and regular rhythm.      Heart sounds: Normal heart sounds.   Pulmonary:      Breath sounds: Normal breath sounds. No wheezing or rhonchi.   Abdominal:      General: Abdomen is flat.      Palpations: Abdomen is soft.      Tenderness: There is no abdominal tenderness.   Musculoskeletal:         General: Normal range of motion.      Cervical back: Normal range of motion and neck supple.   Lymphadenopathy:      Cervical: No cervical adenopathy.   Neurological:      General: No focal deficit present.      Mental Status: She is alert.   Psychiatric:         Mood and Affect: Mood normal.         Thought Content: Thought content normal.           Assessment / Plan      Assessment/Plan:   Assessment & Plan  Annual physical exam  Age appropriate screenings and recommendations reviewed  Fasting labs today  Biometric screening form completed  Cont healthy diet, regular exercise  Follows w OB/Gyn, pap UTD  Orders:    Comprehensive Metabolic Panel; Future    Lipid Panel With / Chol / HDL Ratio; Future    TSH Rfx On Abnormal To Free T4; Future    Hemoglobin A1c; Future    CBC (No Diff); Future    Irritable bowel syndrome with diarrhea  Symptoms controlled  Orders:    hyoscyamine (LEVSIN) 0.125 MG SL tablet; Place 1 tablet under the tongue Every 6 (Six) Hours As Needed for Cramping.    Anxiety  Well " controlled  Cont current regimen as below  Orders:    FLUoxetine (PROzac) 10 MG capsule; Take 1 capsule by mouth Daily.    hydrOXYzine pamoate (Vistaril) 25 MG capsule; Take 1 capsule by mouth 3 (Three) Times a Day As Needed for Anxiety.    ondansetron ODT (ZOFRAN-ODT) 4 MG disintegrating tablet; Place 1 tablet on the tongue Every 8 (Eight) Hours As Needed for Nausea or Vomiting.    Screening for thyroid disorder    Orders:    TSH Rfx On Abnormal To Free T4; Future    Screening for lipid disorders    Orders:    Lipid Panel With / Chol / HDL Ratio; Future    Screening for deficiency anemia    Orders:    CBC (No Diff); Future        Healthcare Maintenance:  Counseling provided based on age appropriate USPSTF guidelines. Preventive counseling and anticipatory guidance discussed on the following topics: nutrition, physical activity, healthy weight, family planning/contraception, dental health, mental health, and immunizations    David Coyle voices understanding and acceptance of this advice and will call back with any further questions or concerns. AVS with preventive healthcare tips printed for patient.         Follow Up:   Return if symptoms worsen or fail to improve.          Eugenia Mixon DO  [facility] [place] [address]   (1) flexion of extremities

## 2025-06-11 ENCOUNTER — PATIENT MESSAGE (OUTPATIENT)
Dept: FAMILY MEDICINE CLINIC | Facility: CLINIC | Age: 25
End: 2025-06-11
Payer: COMMERCIAL

## 2025-06-12 RX ORDER — VALACYCLOVIR HYDROCHLORIDE 500 MG/1
500 TABLET, FILM COATED ORAL EVERY 12 HOURS
Qty: 6 TABLET | Refills: 0 | Status: SHIPPED | OUTPATIENT
Start: 2025-06-12